# Patient Record
Sex: FEMALE | Race: WHITE | NOT HISPANIC OR LATINO | Employment: OTHER
[De-identification: names, ages, dates, MRNs, and addresses within clinical notes are randomized per-mention and may not be internally consistent; named-entity substitution may affect disease eponyms.]

---

## 2017-07-22 ENCOUNTER — HEALTH MAINTENANCE LETTER (OUTPATIENT)
Age: 62
End: 2017-07-22

## 2017-08-01 ENCOUNTER — HOSPITAL ENCOUNTER (OUTPATIENT)
Facility: CLINIC | Age: 62
End: 2017-08-01
Attending: OPHTHALMOLOGY | Admitting: OPHTHALMOLOGY
Payer: COMMERCIAL

## 2017-08-31 ENCOUNTER — OFFICE VISIT (OUTPATIENT)
Dept: INTERNAL MEDICINE | Facility: CLINIC | Age: 62
End: 2017-08-31
Payer: COMMERCIAL

## 2017-08-31 VITALS
OXYGEN SATURATION: 100 % | HEART RATE: 79 BPM | BODY MASS INDEX: 34.47 KG/M2 | SYSTOLIC BLOOD PRESSURE: 126 MMHG | WEIGHT: 190 LBS | DIASTOLIC BLOOD PRESSURE: 80 MMHG | TEMPERATURE: 98.3 F

## 2017-08-31 DIAGNOSIS — H26.9 CATARACT OF BOTH EYES, UNSPECIFIED CATARACT TYPE: ICD-10-CM

## 2017-08-31 DIAGNOSIS — E66.9 NON MORBID OBESITY, UNSPECIFIED OBESITY TYPE: ICD-10-CM

## 2017-08-31 DIAGNOSIS — Z12.31 ENCOUNTER FOR SCREENING MAMMOGRAM FOR BREAST CANCER: ICD-10-CM

## 2017-08-31 DIAGNOSIS — Z01.818 PREOP GENERAL PHYSICAL EXAM: Primary | ICD-10-CM

## 2017-08-31 DIAGNOSIS — D12.6 ADENOMATOUS POLYP OF COLON, UNSPECIFIED PART OF COLON: ICD-10-CM

## 2017-08-31 PROCEDURE — 99204 OFFICE O/P NEW MOD 45 MIN: CPT | Performed by: INTERNAL MEDICINE

## 2017-08-31 NOTE — MR AVS SNAPSHOT
After Visit Summary   8/31/2017    Nayeli Byrd    MRN: [id number]           Patient Information     Date Of Birth          1955        Visit Information        Provider Department      8/31/2017 10:00 AM Wing Olivia MD [facility]        Today's Diagnoses     Preop general physical exam    -  1      Care Instructions    No solid food after  Midnight prior to surgery. May have clear liquids up to 5 hours prior to surgery  Appt with myself or GYN dept for pap/pelvic exam  Call  [phone] or use [employer] to schedule a future lab appointment  fasting in the next 1 month  Calorie/carbohydrate (sugar, bread, potato, pasta, etc) reduction in diet for weight loss.  Increase color on your plate with fruits and vegetables. Increase  frequency of walking or other aerobic exercise as able (goal is daily)  [facility]  with  [facility]. They will call to schedule. If not heard from them in 1 week, then call [phone].           Follow-ups after your visit        Your next 10 appointments already scheduled     Sep 07, 2017   Procedure with Clemente Sykes MD   [facility] (--)    [address]  [facility] [address]   [phone]            Sep 07, 2017   Procedure with Clemente Sykes MD   [facility] (--)    [address]  [facility] [address]   [phone]            Sep 21, 2017   Procedure with Clemente Sykes MD   [facility] (--)    [address]  [facility] [address]   [phone]            Sep 21, 2017   Procedure with Clemente Sykes MD   [facility] (--)    [address]  [facility] [address]   [phone]            Oct 03, 2017 10:00 AM CDT   MA SCREENING DIGITAL BILATERAL with OXMA1   [facility] ([facility]  "[facility])    [address] [address] [phone]           Do not use any powder, lotion or deodorant under your arms or on your breast. If you do, we will ask you to remove it before your exam.  Wear comfortable, two-piece clothing.  If you have any allergies, tell your care team.  Bring any previous mammograms from other facilities or have them mailed to the breast center. Three-dimensional (3D) mammograms are available at [place] locations in [facility], [facility], and [place]. [facility] locations include [facility] in [place].   Benefits of 3D mammograms include: - Improved rate of cancer detection - Decreases your chance of having to go back for more tests, which means fewer: - \"False-positive\" results (This means that there is an abnormal area but it isn't cancer.) - Invasive testing procedures, such as a biopsy or surgery - Can provide clearer images of the breast if you have dense breast tissue. 3D mammography is an optional exam that anyone can have with a 2D mammogram. It doesn't replace or take the place of a 2D mammogram. 2D mammograms remain an effective screening test for all women.  Not all insurance companies cover the cost of a 3D mammogram. Check with your insurance.              Who to contact     If you have questions or need follow up information about today's clinic visit or your schedule please contact [facility] directly at [phone].  Normal or non-critical lab and imaging results will be communicated to you by MyChart, letter or phone within 4 business days after the clinic has received the results. If you do not hear from us within 7 days, please contact the clinic through [employer] or phone. If you have a critical or abnormal lab result, we will notify you by phone as soon as possible.  Submit refill requests through [employer] or call your pharmacy and they will forward the " refill request to us. Please allow 3 business days for your refill to be completed.          Additional Information About Your Visit        MyChart Information     [employer] gives you secure access to your electronic health record. If you see a primary care provider, you can also send messages to your care team and make appointments. If you have questions, please call your primary care clinic.  If you do not have a primary care provider, please call [phone] and they will assist you.        Care EveryWhere ID     This is your Care EveryWhere ID. This could be used by other organizations to access your [place] medical records  [id number]        Your Vitals Were     Pulse Temperature Last Period Pulse Oximetry BMI (Body Mass Index)       79 98.3  F (36.8  C) (Oral) 02/25/2004 100% 34.47 kg/m2        Blood Pressure from Last 3 Encounters:   08/31/17 126/80   01/19/15 116/80   03/01/13 134/80    Weight from Last 3 Encounters:   08/31/17 190 lb (86.2 kg)   01/19/15 158 lb 12.8 oz (72 kg)   03/01/13 161 lb (73 kg)              Today, you had the following     No orders found for display       Primary Care Provider Office Phone # Fax #    Wing Olivia [phone]       [address] [facility] [address]        Equal Access to Services     CHASTITY CROSS : Hadii aad ku hadasho Soomaali, waaxda luqadaha, qaybta kaalmada adeegyada, waxay idiin haytatyana cardoza . So [facility] [phone].    ATENCIÓN: Si habla español, tiene a morrow disposición servicios gratuitos de asistencia lingüística. Llame al [phone].    We comply with applicable federal civil rights laws and [place] laws. We do not discriminate on the basis of race, color, national origin, age, disability sex, sexual orientation or gender identity.            Thank you!     Thank you for choosing [facility]  for your care. Our goal is always to provide you with excellent care. Hearing back from our patients is  one way we can continue to improve our services. Please take a few minutes to complete the written survey that you may receive in the mail after your visit with us. Thank you!             Your Updated Medication List - Protect others around you: Learn how to safely use, store and throw away your medicines at www.disposemymeds.org.      Notice  As of 8/31/2017 10:36 AM    You have not been prescribed any medications.

## 2017-08-31 NOTE — NURSING NOTE
"Chief Complaint   Patient presents with     Pre-Op Exam       Initial /80  Pulse 79  Temp 98.3  F (36.8  C) (Oral)  Wt 190 lb (86.2 kg)  LMP 02/25/2004  SpO2 100%  BMI 34.47 kg/m2 Estimated body mass index is 34.47 kg/(m^2) as calculated from the following:    Height as of 1/19/15: 5' 2.25\" (1.581 m).    Weight as of this encounter: 190 lb (86.2 kg).  Medication Reconciliation: complete  "

## 2017-08-31 NOTE — PATIENT INSTRUCTIONS
No solid food after  Midnight prior to surgery. May have clear liquids up to 5 hours prior to surgery  Appt with myself or GYN dept for pap/pelvic exam  Call  [phone] or use [employer] to schedule a future lab appointment  fasting in the next 1 month  Calorie/carbohydrate (sugar, bread, potato, pasta, etc) reduction in diet for weight loss.  Increase color on your plate with fruits and vegetables. Increase  frequency of walking or other aerobic exercise as able (goal is daily)  Mammogram  [place]  Colonoscopy  with  [facility]. They will call to schedule. If not heard from them in 1 week, then call [phone].

## 2017-08-31 NOTE — PROGRESS NOTES
[address] [address]  [phone]  Dept: [phone]    PRE-OP EVALUATION:  Today's date: 2017    Nayeli Byrd (: 1955) presents for pre-operative evaluation assessment as requested by Dr. Sykes.  She requires evaluation and anesthesia risk assessment prior to undergoing surgery/procedure for treatment of Catatracts .  Proposed procedure: Removal of Catatracts    Date of Surgery/ Procedure: 2017 and 2017  Time of Surgery/ Procedure: 12:30pm  Hospital/Surgical Facility: [facility]  Primary Physician: Wing Olivia  Type of Anesthesia Anticipated: local    Patient has a Health Care Directive or Living Will:  YES     Preop Questions 2017   1.  Do you have a history of heart attack, stroke, stent, bypass or surgery on an artery in the head, neck, heart or legs? No   2.  Do you ever have any pain or discomfort in your chest? No   3.  Do you have a history of  Heart Failure? No   4.   Are you troubled by shortness of breath when:  walking on a level surface, or up a slight hill, or at night? No   5.  Do you currently have a cold, bronchitis or other respiratory infection? No   6.  Do you have a cough, shortness of breath, or wheezing? No   7.  Do you sometimes get pains in the calves of your legs when you walk? No   8. Do you or anyone in your family have previous history of blood clots? No   9.  Do you or does anyone in your family have a serious bleeding problem such as prolonged bleeding following surgeries or cuts? No   10. Have you ever had problems with anemia or been told to take iron pills? No   11. Have you had any abnormal blood loss such as black, tarry or bloody stools, or abnormal vaginal bleeding? No   12. Have you ever had a blood transfusion? No   13. Have you or any of your relatives ever had problems with anesthesia? No   14. Do you have sleep apnea, excessive snoring or daytime drowsiness? No   15.  Do you have any prosthetic heart valves? No   16. Do you have prosthetic joints? No   17. Is there any chance that you may be pregnant? No           HPI:                                                      Brief HPI related to upcoming procedure: bilateral cataracts with clouding of vision and trouble driving at night. Presents for clearance leilani undergo surgical correction. See below for other medical issues. Due for pap/pelvbic and colonoscopy with hx polyp 5 years ago. Due for mammogram screening.            MEDICAL HISTORY:                                                    Patient Active Problem List    Diagnosis Date Noted     Pain in shoulder 2013     Priority: Medium     Advanced directives, counseling/discussion 10/30/2012     Priority: Medium     Discussed advance care planning with patient; however, patient declined at this time. 10/30/2012          Colonic polyp      Priority: Medium     HYPERLIPIDEMIA LDL GOAL <130 10/31/2010     Priority: Medium      Past Medical History:   Diagnosis Date     Colonic polyp      Contact dermatitis and other eczema, due to unspecified cause      Depressive disorder, not elsewhere classified      Fracture, humerus closed 2013    Closed left humeral head fracture.     Hyperlipidemia LDL goal <130       Morbid obesity (H)      Unspecified essential hypertension      Past Surgical History:   Procedure Laterality Date     C/SECTION, LOW TRANSVERSE      , Low Transverse     EYE SURGERY       ORTHOPEDIC SURGERY      right foot surgery     No current outpatient prescriptions on file.     OTC products: none    Allergies   Allergen Reactions     Hctz      Renal insufficiency      Latex Allergy: NO    Social History   Substance Use Topics     Smoking status: Never Smoker     Smokeless tobacco: Never Used     Alcohol use Yes      Comment: 2 per day     History   Drug Use No       REVIEW OF SYSTEMS:                                                    C: NEGATIVE for  fever, chills. Weight up 32 pounds in 3 years after previous weight loss  I: NEGATIVE for worrisome rashes, moles or lesions  E: See above  E/M: NEGATIVE for ear, mouth and throat problems  R: NEGATIVE for significant cough or SOB  B: NEGATIVE for masses, tenderness or discharge  CV: NEGATIVE for chest pain, palpitations or peripheral edema  GI: NEGATIVE for nausea, abdominal pain, heartburn, or change in bowel habits  : NEGATIVE for frequency, dysuria, or hematuria  M: NEGATIVE for significant arthralgias or myalgia  N: NEGATIVE for weakness, dizziness or paresthesias  E: NEGATIVE for temperature intolerance, skin/hair changes. POSITIVE for obesity  H: NEGATIVE for bleeding problems  P: NEGATIVE for changes in mood or affect    EXAM:                                                    /80  Pulse 79  Temp 98.3  F (36.8  C) (Oral)  Wt 190 lb (86.2 kg)  LMP 02/25/2004  SpO2 100%  BMI 34.47 kg/m2  Eye: PERRL, EOMI. Bilateral cataracts  HENT: ear canals and TM's normal and nose and mouth without ulcers or lesions   Neck: no adenopathy. Thyroid normal to palpation. No bruits  Pulm: Lungs clear to auscultation   CV: Regular rates and rhythm  GI: Soft, obese,  nontender, Normal active bowel sounds, No hepatosplenomegaly or masses palpable  Ext: Peripheral pulses intact. No edema.  Neuro: Normal strength and tone, sensory exam grossly normal      DIAGNOSTICS:                                                    No labs or EKG required for low risk surgery (cataract)    Recent Labs   Lab Test  10/30/12   0935   HGB  15.5   PLT  190   NA  143   POTASSIUM  4.2   CR  0.63        IMPRESSION:                                                    Reason for surgery/procedure:  Bilateral cataracts  Diagnosis/reason for consult:  Obesity, history of colon polyp     The proposed surgical procedure is considered LOW risk.    REVISED CARDIAC RISK INDEX  The patient has the following serious cardiovascular risks for  perioperative complications such as (MI, PE, VFib and 3  AV Block):  No serious cardiac risks  INTERPRETATION: 0 risks: Class I (very low risk - 0.4% complication rate)    The patient has the following additional risks for perioperative complications:  No identified additional risks         RECOMMENDATIONS:                                                      APPROVAL GIVEN to proceed with proposed procedure, without further diagnostic evaluation     PLAN:  No solid food after  midnight prior to surgery. May have clear liquids up to 5 hours prior to surgery  Appt with myself or GYN dept for pap/pelvic exam  Call  [phone] or use [employer] to schedule a future lab appointment  fasting in the next 1 month  Calorie/carbohydrate (sugar, bread, potato, pasta, etc) reduction in diet for weight loss.  Increase color on your plate with fruits and vegetables. Increase  frequency of walking or other aerobic exercise as able (goal is daily)  Mammogram  [place]  Colonoscopy  with  [facility]. They will call to schedule. If not heard from them in 1 week, then call [phone].            Signed Electronically by: Wing Olivia MD    Copy of this evaluation report is provided to requesting physician.    [facility] Preop Guidelines

## 2017-09-02 ENCOUNTER — HEALTH MAINTENANCE LETTER (OUTPATIENT)
Age: 62
End: 2017-09-02

## 2017-09-06 NOTE — H&P (VIEW-ONLY)
[address] [address]  [phone]  Dept: [phone]    PRE-OP EVALUATION:  Today's date: 2017    Nayeli Byrd (: 1955) presents for pre-operative evaluation assessment as requested by Dr. Sykes.  She requires evaluation and anesthesia risk assessment prior to undergoing surgery/procedure for treatment of Catatracts .  Proposed procedure: Removal of Catatracts    Date of Surgery/ Procedure: 2017 and 2017  Time of Surgery/ Procedure: 12:30pm  Hospital/Surgical Facility: [facility]  Primary Physician: Wing Olivia  Type of Anesthesia Anticipated: local    Patient has a Health Care Directive or Living Will:  YES     Preop Questions 2017   1.  Do you have a history of heart attack, stroke, stent, bypass or surgery on an artery in the head, neck, heart or legs? No   2.  Do you ever have any pain or discomfort in your chest? No   3.  Do you have a history of  Heart Failure? No   4.   Are you troubled by shortness of breath when:  walking on a level surface, or up a slight hill, or at night? No   5.  Do you currently have a cold, bronchitis or other respiratory infection? No   6.  Do you have a cough, shortness of breath, or wheezing? No   7.  Do you sometimes get pains in the calves of your legs when you walk? No   8. Do you or anyone in your family have previous history of blood clots? No   9.  Do you or does anyone in your family have a serious bleeding problem such as prolonged bleeding following surgeries or cuts? No   10. Have you ever had problems with anemia or been told to take iron pills? No   11. Have you had any abnormal blood loss such as black, tarry or bloody stools, or abnormal vaginal bleeding? No   12. Have you ever had a blood transfusion? No   13. Have you or any of your relatives ever had problems with anesthesia? No   14. Do you have sleep apnea, excessive snoring or daytime drowsiness? No   15.  Do you have any prosthetic heart valves? No   16. Do you have prosthetic joints? No   17. Is there any chance that you may be pregnant? No           HPI:                                                      Brief HPI related to upcoming procedure: bilateral cataracts with clouding of vision and trouble driving at night. Presents for clearance leilani undergo surgical correction. See below for other medical issues. Due for pap/pelvbic and colonoscopy with hx polyp 5 years ago. Due for mammogram screening.            MEDICAL HISTORY:                                                    Patient Active Problem List    Diagnosis Date Noted     Pain in shoulder 2013     Priority: Medium     Advanced directives, counseling/discussion 10/30/2012     Priority: Medium     Discussed advance care planning with patient; however, patient declined at this time. 10/30/2012          Colonic polyp      Priority: Medium     HYPERLIPIDEMIA LDL GOAL <130 10/31/2010     Priority: Medium      Past Medical History:   Diagnosis Date     Colonic polyp      Contact dermatitis and other eczema, due to unspecified cause      Depressive disorder, not elsewhere classified      Fracture, humerus closed 2013    Closed left humeral head fracture.     Hyperlipidemia LDL goal <130       Morbid obesity (H)      Unspecified essential hypertension      Past Surgical History:   Procedure Laterality Date     C/SECTION, LOW TRANSVERSE      , Low Transverse     EYE SURGERY       ORTHOPEDIC SURGERY      right foot surgery     No current outpatient prescriptions on file.     OTC products: none    Allergies   Allergen Reactions     Hctz      Renal insufficiency      Latex Allergy: NO    Social History   Substance Use Topics     Smoking status: Never Smoker     Smokeless tobacco: Never Used     Alcohol use Yes      Comment: 2 per day     History   Drug Use No       REVIEW OF SYSTEMS:                                                    C: NEGATIVE for  fever, chills. Weight up 32 pounds in 3 years after previous weight loss  I: NEGATIVE for worrisome rashes, moles or lesions  E: See above  E/M: NEGATIVE for ear, mouth and throat problems  R: NEGATIVE for significant cough or SOB  B: NEGATIVE for masses, tenderness or discharge  CV: NEGATIVE for chest pain, palpitations or peripheral edema  GI: NEGATIVE for nausea, abdominal pain, heartburn, or change in bowel habits  : NEGATIVE for frequency, dysuria, or hematuria  M: NEGATIVE for significant arthralgias or myalgia  N: NEGATIVE for weakness, dizziness or paresthesias  E: NEGATIVE for temperature intolerance, skin/hair changes. POSITIVE for obesity  H: NEGATIVE for bleeding problems  P: NEGATIVE for changes in mood or affect    EXAM:                                                    /80  Pulse 79  Temp 98.3  F (36.8  C) (Oral)  Wt 190 lb (86.2 kg)  LMP 02/25/2004  SpO2 100%  BMI 34.47 kg/m2  Eye: PERRL, EOMI. Bilateral cataracts  HENT: ear canals and TM's normal and nose and mouth without ulcers or lesions   Neck: no adenopathy. Thyroid normal to palpation. No bruits  Pulm: Lungs clear to auscultation   CV: Regular rates and rhythm  GI: Soft, obese,  nontender, Normal active bowel sounds, No hepatosplenomegaly or masses palpable  Ext: Peripheral pulses intact. No edema.  Neuro: Normal strength and tone, sensory exam grossly normal      DIAGNOSTICS:                                                    No labs or EKG required for low risk surgery (cataract)    Recent Labs   Lab Test  10/30/12   0935   HGB  15.5   PLT  190   NA  143   POTASSIUM  4.2   CR  0.63        IMPRESSION:                                                    Reason for surgery/procedure:  Bilateral cataracts  Diagnosis/reason for consult:  Obesity, history of colon polyp     The proposed surgical procedure is considered LOW risk.    REVISED CARDIAC RISK INDEX  The patient has the following serious cardiovascular risks for  perioperative complications such as (MI, PE, VFib and 3  AV Block):  No serious cardiac risks  INTERPRETATION: 0 risks: Class I (very low risk - 0.4% complication rate)    The patient has the following additional risks for perioperative complications:  No identified additional risks         RECOMMENDATIONS:                                                      APPROVAL GIVEN to proceed with proposed procedure, without further diagnostic evaluation     PLAN:  No solid food after  midnight prior to surgery. May have clear liquids up to 5 hours prior to surgery  Appt with myself or GYN dept for pap/pelvic exam  Call  [phone] or use [employer] to schedule a future lab appointment  fasting in the next 1 month  Calorie/carbohydrate (sugar, bread, potato, pasta, etc) reduction in diet for weight loss.  Increase color on your plate with fruits and vegetables. Increase  frequency of walking or other aerobic exercise as able (goal is daily)  Mammogram  [place]  Colonoscopy  with  [facility]. They will call to schedule. If not heard from them in 1 week, then call [phone].            Signed Electronically by: Wing Olivia MD    Copy of this evaluation report is provided to requesting physician.    [facility] Preop Guidelines

## 2017-09-07 ENCOUNTER — HOSPITAL ENCOUNTER (OUTPATIENT)
Facility: CLINIC | Age: 62
Discharge: HOME | End: 2017-09-07
Attending: OPHTHALMOLOGY | Admitting: OPHTHALMOLOGY
Payer: COMMERCIAL

## 2017-09-07 ENCOUNTER — ANESTHESIA (OUTPATIENT)
Dept: SURGERY | Facility: CLINIC | Age: 62
End: 2017-09-07
Payer: COMMERCIAL

## 2017-09-07 ENCOUNTER — SURGERY (OUTPATIENT)
Age: 62
End: 2017-09-07

## 2017-09-07 ENCOUNTER — ANESTHESIA EVENT (OUTPATIENT)
Dept: SURGERY | Facility: CLINIC | Age: 62
End: 2017-09-07
Payer: COMMERCIAL

## 2017-09-07 VITALS
WEIGHT: 170 LBS | BODY MASS INDEX: 31.28 KG/M2 | HEIGHT: 62 IN | DIASTOLIC BLOOD PRESSURE: 103 MMHG | OXYGEN SATURATION: 94 % | RESPIRATION RATE: 16 BRPM | SYSTOLIC BLOOD PRESSURE: 146 MMHG

## 2017-09-07 PROCEDURE — 40000170 ZZH STATISTIC PRE-PROCEDURE ASSESSMENT II: Performed by: OPHTHALMOLOGY

## 2017-09-07 PROCEDURE — V2632 POST CHMBR INTRAOCULAR LENS: HCPCS | Performed by: OPHTHALMOLOGY

## 2017-09-07 PROCEDURE — 25000128 H RX IP 250 OP 636: Performed by: OPHTHALMOLOGY

## 2017-09-07 PROCEDURE — 25000125 ZZHC RX 250: Performed by: ANESTHESIOLOGY

## 2017-09-07 PROCEDURE — V2788 PRESBYOPIA-CORRECT FUNCTION: HCPCS | Performed by: OPHTHALMOLOGY

## 2017-09-07 PROCEDURE — 71000028 ZZH EYE RECOVERY PHASE 2 EACH 15 MINS: Performed by: OPHTHALMOLOGY

## 2017-09-07 PROCEDURE — 27210794 ZZH OR GENERAL SUPPLY STERILE: Performed by: OPHTHALMOLOGY

## 2017-09-07 PROCEDURE — 36000135 ZZH KERATOTOMY ARCUATE W FEMTOSECOND LASER/IMAGING FOR ATIOL: Performed by: OPHTHALMOLOGY

## 2017-09-07 PROCEDURE — 37000008 ZZH ANESTHESIA TECHNICAL FEE, 1ST 30 MIN: Performed by: OPHTHALMOLOGY

## 2017-09-07 PROCEDURE — 25000125 ZZHC RX 250

## 2017-09-07 PROCEDURE — 25000128 H RX IP 250 OP 636: Performed by: ANESTHESIOLOGY

## 2017-09-07 PROCEDURE — 36000101 ZZH EYE SURGERY LEVEL 3 1ST 30 MIN: Performed by: OPHTHALMOLOGY

## 2017-09-07 PROCEDURE — 25000128 H RX IP 250 OP 636: Performed by: NURSE ANESTHETIST, CERTIFIED REGISTERED

## 2017-09-07 PROCEDURE — 25000125 ZZHC RX 250: Performed by: OPHTHALMOLOGY

## 2017-09-07 DEVICE — IMPLANTABLE DEVICE: Type: IMPLANTABLE DEVICE | Site: EYE | Status: FUNCTIONAL

## 2017-09-07 RX ORDER — ONDANSETRON 2 MG/ML
INJECTION INTRAMUSCULAR; INTRAVENOUS PRN
Status: DISCONTINUED | OUTPATIENT
Start: 2017-09-07 | End: 2017-09-07

## 2017-09-07 RX ORDER — PROPARACAINE HYDROCHLORIDE 5 MG/ML
SOLUTION/ DROPS OPHTHALMIC PRN
Status: DISCONTINUED | OUTPATIENT
Start: 2017-09-07 | End: 2017-09-07 | Stop reason: HOSPADM

## 2017-09-07 RX ORDER — MOXIFLOXACIN 5 MG/ML
1 SOLUTION/ DROPS OPHTHALMIC
Status: COMPLETED | OUTPATIENT
Start: 2017-09-07 | End: 2017-09-07

## 2017-09-07 RX ORDER — SODIUM CHLORIDE, SODIUM LACTATE, POTASSIUM CHLORIDE, CALCIUM CHLORIDE 600; 310; 30; 20 MG/100ML; MG/100ML; MG/100ML; MG/100ML
500 INJECTION, SOLUTION INTRAVENOUS CONTINUOUS
Status: DISCONTINUED | OUTPATIENT
Start: 2017-09-07 | End: 2017-09-07 | Stop reason: HOSPADM

## 2017-09-07 RX ORDER — BALANCED SALT SOLUTION 6.4; .75; .48; .3; 3.9; 1.7 MG/ML; MG/ML; MG/ML; MG/ML; MG/ML; MG/ML
SOLUTION OPHTHALMIC PRN
Status: DISCONTINUED | OUTPATIENT
Start: 2017-09-07 | End: 2017-09-07 | Stop reason: HOSPADM

## 2017-09-07 RX ORDER — FENTANYL CITRATE 50 UG/ML
INJECTION, SOLUTION INTRAMUSCULAR; INTRAVENOUS PRN
Status: DISCONTINUED | OUTPATIENT
Start: 2017-09-07 | End: 2017-09-07

## 2017-09-07 RX ORDER — TETRACAINE HYDROCHLORIDE 5 MG/ML
SOLUTION OPHTHALMIC PRN
Status: DISCONTINUED | OUTPATIENT
Start: 2017-09-07 | End: 2017-09-07 | Stop reason: HOSPADM

## 2017-09-07 RX ORDER — DICLOFENAC SODIUM 1 MG/ML
1 SOLUTION/ DROPS OPHTHALMIC
Status: COMPLETED | OUTPATIENT
Start: 2017-09-07 | End: 2017-09-07

## 2017-09-07 RX ORDER — PROPARACAINE HYDROCHLORIDE 5 MG/ML
1 SOLUTION/ DROPS OPHTHALMIC ONCE
Status: COMPLETED | OUTPATIENT
Start: 2017-09-07 | End: 2017-09-07

## 2017-09-07 RX ORDER — LIDOCAINE HYDROCHLORIDE 10 MG/ML
INJECTION, SOLUTION EPIDURAL; INFILTRATION; INTRACAUDAL; PERINEURAL PRN
Status: DISCONTINUED | OUTPATIENT
Start: 2017-09-07 | End: 2017-09-07 | Stop reason: HOSPADM

## 2017-09-07 RX ORDER — TROPICAMIDE 10 MG/ML
1 SOLUTION/ DROPS OPHTHALMIC
Status: COMPLETED | OUTPATIENT
Start: 2017-09-07 | End: 2017-09-07

## 2017-09-07 RX ORDER — PHENYLEPHRINE HYDROCHLORIDE 25 MG/ML
1 SOLUTION/ DROPS OPHTHALMIC
Status: COMPLETED | OUTPATIENT
Start: 2017-09-07 | End: 2017-09-07

## 2017-09-07 RX ADMIN — TROPICAMIDE 1 DROP: 10 SOLUTION/ DROPS OPHTHALMIC at 11:23

## 2017-09-07 RX ADMIN — LIDOCAINE HYDROCHLORIDE 1 ML: 10 INJECTION, SOLUTION EPIDURAL; INFILTRATION; INTRACAUDAL; PERINEURAL at 12:30

## 2017-09-07 RX ADMIN — Medication 0.8 ML: at 12:30

## 2017-09-07 RX ADMIN — LIDOCAINE HYDROCHLORIDE 1 ML: 10 INJECTION, SOLUTION EPIDURAL; INFILTRATION; INTRACAUDAL; PERINEURAL at 11:38

## 2017-09-07 RX ADMIN — DICLOFENAC SODIUM 1 DROP: 1 SOLUTION OPHTHALMIC at 11:28

## 2017-09-07 RX ADMIN — PHENYLEPHRINE HYDROCHLORIDE 1 DROP: 2.5 SOLUTION/ DROPS OPHTHALMIC at 11:17

## 2017-09-07 RX ADMIN — PHENYLEPHRINE HYDROCHLORIDE 1 DROP: 2.5 SOLUTION/ DROPS OPHTHALMIC at 11:28

## 2017-09-07 RX ADMIN — FENTANYL CITRATE 25 MCG: 50 INJECTION, SOLUTION INTRAMUSCULAR; INTRAVENOUS at 12:34

## 2017-09-07 RX ADMIN — TETRACAINE HYDROCHLORIDE 2 DROP: 5 SOLUTION OPHTHALMIC at 12:31

## 2017-09-07 RX ADMIN — EPINEPHRINE 0.5 ML: 1 INJECTION, SOLUTION, CONCENTRATE INTRAVENOUS at 12:30

## 2017-09-07 RX ADMIN — DICLOFENAC SODIUM 1 DROP: 1 SOLUTION OPHTHALMIC at 11:17

## 2017-09-07 RX ADMIN — FENTANYL CITRATE 50 MCG: 50 INJECTION, SOLUTION INTRAMUSCULAR; INTRAVENOUS at 12:22

## 2017-09-07 RX ADMIN — MOXIFLOXACIN 1 DROP: 5 SOLUTION/ DROPS OPHTHALMIC at 11:17

## 2017-09-07 RX ADMIN — BALANCED SALT SOLUTION 15 ML: 6.4; .75; .48; .3; 3.9; 1.7 SOLUTION OPHTHALMIC at 12:38

## 2017-09-07 RX ADMIN — MOXIFLOXACIN 1 DROP: 5 SOLUTION/ DROPS OPHTHALMIC at 11:28

## 2017-09-07 RX ADMIN — MIDAZOLAM HYDROCHLORIDE 2 MG: 1 INJECTION, SOLUTION INTRAMUSCULAR; INTRAVENOUS at 12:22

## 2017-09-07 RX ADMIN — PROPARACAINE HYDROCHLORIDE 2 DROP: 5 SOLUTION/ DROPS OPHTHALMIC at 12:39

## 2017-09-07 RX ADMIN — PHENYLEPHRINE HYDROCHLORIDE 1 DROP: 2.5 SOLUTION/ DROPS OPHTHALMIC at 11:23

## 2017-09-07 RX ADMIN — ONDANSETRON 4 MG: 2 INJECTION INTRAMUSCULAR; INTRAVENOUS at 12:27

## 2017-09-07 RX ADMIN — FENTANYL CITRATE 25 MCG: 50 INJECTION, SOLUTION INTRAMUSCULAR; INTRAVENOUS at 12:30

## 2017-09-07 RX ADMIN — PROPARACAINE HYDROCHLORIDE 1 DROP: 5 SOLUTION/ DROPS OPHTHALMIC at 11:17

## 2017-09-07 RX ADMIN — TROPICAMIDE 1 DROP: 10 SOLUTION/ DROPS OPHTHALMIC at 11:28

## 2017-09-07 RX ADMIN — MOXIFLOXACIN 1 DROP: 5 SOLUTION/ DROPS OPHTHALMIC at 11:23

## 2017-09-07 RX ADMIN — SODIUM CHLORIDE, SODIUM LACTATE, POTASSIUM CHLORIDE, CALCIUM CHLORIDE: 600; 310; 30; 20 INJECTION, SOLUTION INTRAVENOUS at 12:14

## 2017-09-07 RX ADMIN — BALANCED SALT SOLUTION 15 ML: 6.4; .75; .48; .3; 3.9; 1.7 SOLUTION OPHTHALMIC at 12:30

## 2017-09-07 RX ADMIN — EPINEPHRINE 500 ML: 1 INJECTION, SOLUTION, CONCENTRATE INTRAVENOUS at 12:30

## 2017-09-07 RX ADMIN — DICLOFENAC SODIUM 1 DROP: 1 SOLUTION OPHTHALMIC at 11:23

## 2017-09-07 RX ADMIN — TROPICAMIDE 1 DROP: 10 SOLUTION/ DROPS OPHTHALMIC at 11:17

## 2017-09-07 ASSESSMENT — ENCOUNTER SYMPTOMS: SEIZURES: 0

## 2017-09-07 ASSESSMENT — LIFESTYLE VARIABLES: TOBACCO_USE: 0

## 2017-09-07 NOTE — IP AVS SNAPSHOT
MRN:[id number]                      After Visit Summary   9/7/2017    Nayeli Byrd    MRN: [id number]           Thank you!     Thank you for choosing [facility] for your care. Our goal is always to provide you with excellent care. Hearing back from our patients is one way we can continue to improve our services. Please take a few minutes to complete the written survey that you may receive in the mail after you visit with us. Thank you!        Patient Information     Date Of Birth          1955        About your hospital stay     You were admitted on:  September 7, 2017 You last received care in the:  [facility]    You were discharged on:  September 7, 2017       Who to Call     For medical emergencies, please call 911.  For non-urgent questions about your medical care, please call your primary care provider or clinic, [phone]  For questions related to your surgery, please call your surgery clinic        Attending Provider     Provider Specialty    Clemente Sykes MD Ophthalmology       Primary Care Provider Office Phone # Fax #    Wing Olivia [phone]      Your next 10 appointments already scheduled     Sep 07, 2017   Procedure with Clemente Sykes MD   [facility] (--)    [address]  [facility] [address]   [phone]            Sep 21, 2017   Procedure with Clemente Sykes MD   [facility] (--)    [address]  [facility] [address]   [phone]            Sep 21, 2017   Procedure with Clemente Sykes MD   [facility] (--)    [address]  [facility] [address]   [phone]            Oct 03, 2017 10:00 AM CDT   MA SCREENING DIGITAL BILATERAL with OXMA1   [facility] ([facility])    [address] [address] [phone]     Patient's pharmacy rep, Yanely, calling regarding: paperwork for Medicare that is required for patient's refill.    Please call Yanely at [phone] to advise.    "       Do not use any powder, lotion or deodorant under your arms or on your breast. If you do, we will ask you to remove it before your exam.  Wear comfortable, two-piece clothing.  If you have any allergies, tell your care team.  Bring any previous mammograms from other facilities or have them mailed to the breast center. Three-dimensional (3D) mammograms are available at [place] locations in [facility], and [place]. [facility] locations include [place] and [facility] in [place].   Benefits of 3D mammograms include: - Improved rate of cancer detection - Decreases your chance of having to go back for more tests, which means fewer: - \"False-positive\" results (This means that there is an abnormal area but it isn't cancer.) - Invasive testing procedures, such as a biopsy or surgery - Can provide clearer images of the breast if you have dense breast tissue. 3D mammography is an optional exam that anyone can have with a 2D mammogram. It doesn't replace or take the place of a 2D mammogram. 2D mammograms remain an effective screening test for all women.  Not all insurance companies cover the cost of a 3D mammogram. Check with your insurance.              Further instructions from your care team       [facility] Anesthesia [facility] Discharge  Instructions  Anesthesia (Eye Care Center)   Adult Discharge Instructions    For 24 hours after surgery    1. Get plenty of rest.  Make arrangements to have a responsible adult stay with you for at least 6 hours after you leave the hospital.  2. Do not drive or use heavy equipment for 24 hours.    3. Do not drink alcohol for 24 hours.  4. Do not sign legal documents or make important decisions for 24 hours.  5. Avoid strenuous or risky activities. You may feel lightheaded.  If so, sit for a few minutes before standing.  Have someone help you get up.   6. Conscious sedation patients may resume a regular " "diet..  7. Any questions of medical nature, call your physician.    Cataract Surgery Postoperative Instructions  Dr. Clemente Sykes      Postoperative Medications: After surgery, you will use eye drop medications. In most cases, you will start these eye drops 2 days before the day of surgery.   Follow the directions provided to you from the pharmacy or from the doctor's office.    The drops might sting a little when they are instilled, and that is normal.    It doesn't matter what order you put the drops into your eyes, but you should wait at least one minute between drops.    Recently we started using a compounded drop that contains all three prescriptions in one bottle. If you have this drop you only need to use one drop 4 time per day. Many people choose to do the drops at breakfast, lunch, dinner, and bedtime.    Artificial Tears- Are lubricating drops used to moisturize the eye.  You can use these as much as you want.  Particularly if your eyes feel watery, gritty, or uncomfortable.  Chilling these drops in the refrigerator results in a more soothing feeling.  There are several brands of artificial tears available including, but not limited to, Optive, Refresh, Systane, Blink, Genteal, Soothe, and others.  You should not use drops that are \"gets the red out.\"  You do not need a prescription for these medications.     Please continue any glaucoma, dry eye, or other medications you were using prior to the surgery.      Please allow 24 to 48 hours when requesting refills, and call BEFORE you run out of drops.    Restriction on Activities-  It is extremely important that you DO NOT RUB THE TREATED EYE.    - You will be given a clear plastic shield to wear as protection over your eye the night after surgery.    - Refrain from many activities that may put your eye at risk of injury, as well as areas containing a high volume of chemicals, dust, and debris.    - Do Not wear any eye makeup or moisturizer around the " "eye for 1 week after surgery.    - Do Not swim or go into a hot-tub, jacuzzi, or sauna for 1 week following surgery.  You can take showers as normal, but avoid getting shampoo or soap into your eyes.     - Avoid strenuous activity, including lifting more than 10 pounds, for 1 week after surgery.       - It is fine to bathe, read, watch TV, and use the computer.    Symptoms requiring medical attention:     - Sudden onset of increased discharge from the eye.  - Persistent or increasing pain in the eye.  - Sudden decrease in vision.  - Persistent nausea or vomiting.      If you have any questions or concerns before or after your surgery, please contact Dr. Sykes's office at [phone].         Revised 3/27/2017    Pending Results     No orders found from 9/5/2017 to 9/8/2017.            Admission Information     Date & Time Provider Department Dept. Phone    9/7/2017 Clemente Sykes MD [facility] [phone]      Your Vitals Were     Blood Pressure Respirations Height Weight Last Period Pulse Oximetry    151/101 16 1.575 m (5' 2\") 77.1 kg (170 lb) 02/25/2004 94%    BMI (Body Mass Index)                   31.09 kg/m2           MyChart Information     [facility] gives you secure access to your electronic health record. If you see a primary care provider, you can also send messages to your care team and make appointments. If you have questions, please call your primary care clinic.  If you do not have a primary care provider, please call [phone] and they will assist you.        Care EveryWhere ID     This is your Care EveryWhere ID. This could be used by other organizations to access your [facility] medical records  [id number]        Equal Access to Services     CEDRIC CROSS : Lorenza Rizzo, wilma simental, shanda chavez. So [facility] [phone].    ATENCIÓN: Si habla español, tiene a morrow disposición servicios " marcy de asistencia lingüística. Carlos Enrique cummins [phone].    We comply with applicable federal civil rights laws and [place] laws. We do not discriminate on the basis of race, color, national origin, age, disability sex, sexual orientation or gender identity.               Review of your medicines      Notice     You have not been prescribed any medications.             Protect others around you: Learn how to safely use, store and throw away your medicines at www.disposemymeds.org.             Medication List: This is a list of all your medications and when to take them. Check marks below indicate your daily home schedule. Keep this list as a reference.      Notice     You have not been prescribed any medications.

## 2017-09-07 NOTE — OP NOTE
PATIENT NAME:  Nayeli Byrd    :  1955    PATIENT NUMBER:  [id number]    DATE OF SURGERY:  2017    SURGEON:  Clemente Sykes M.D.    PREOPERATIVE DIAGNOSIS:   1. Cataract right eye  2. Astigmatism right eye    POSTOPERATIVE DIAGNOSIS:  Same    PROCEDURE:   1. Phacoemulusification of cataract with intraocular lens implant right eye.  2. Femtosecond LASER incisions (astigmatic correction) for cataract surgery right eye and nuclear softening.    DETAILS: The patient was brought to the LASER suite. In a supine position the head was secured and the docking apparatus was applied to the eye. When good suction was achieved BSS was added to the well. The chair was then positioned underneath the LASER and docking was successfully achieved. OCT scanning was initiated and approved. The LASER was then used to deliver the desired incisions. See scanned paper note for LASER parameters.     The patient was then moved to the operative suite in stable condition where the eye was prepped and draped in the usual sterile fashion.  A lid speculum was applied. A super sharp blade was used to create a paracentesis, through which 1% preservative free Lidocaine was injected.  Visoelastic was then used to inflate the anterior chamber.  A biplanar incision at the temporal limbus was created with a 2.4 mm keratome or if the main wound was made with the Femtosecond LASER it was bluntly dissected.  A continuous curvilinear capsulorrhexis was started with a cystitome and completed using Utrata forceps.  The lens was hydrodissected and hydro delineated using BSS on a cannula.  The lens nucleus was removed using phacoemulsification.  Remaining cortex was removed using irrigation and aspiration.  Viscoelastic was injected to inflate the capsular bag and a 10.0 D ZXR00 (Symfony) IOL (presbyopia correcting) was inserted into the capsular bag without difficulty.  The lens was easily rotated using a Sinskey hook and good  centration was noted.  Residual viscoelastic and provisc material was removed with irrigation and aspiration.  BSS was used to hydrate the corneal incision and paracentesis sites which were checked and noted to be watertight.  Tobradex ointment was applied to the eye and a clear plastic shield was placed.  The patient tolerated the procedure well and left the operative suite in stable condition.    Clemente Sykes MD

## 2017-09-07 NOTE — ANESTHESIA CARE TRANSFER NOTE
Patient: Nayeli Byrd    Procedure(s):  RIGHT EYE PHACOEMULSIFICATION CLEAR CORNEA WITH DELUXE INTRAOCULAR LENS IMPLANT  - Wound Class: I-Clean    Diagnosis: CATARACT   Diagnosis Additional Information: No value filed.    Anesthesia Type:   MAC     Note:  Airway :Room Air  Patient transferred to:Phase II  Comments: Transferred to Eye Center recovery room in recliner with armrests up, spontaneous respirations, O2 saturation maintained greater than 95% with oxygen via room air. All monitors and alarms on and functioning, clinically stable vital signs. Report given to recovery RN and questions answered. Patient alert and following verbal directions.      Vitals: (Last set prior to Anesthesia Care Transfer)    CRNA VITALS  9/7/2017 1205 - 9/7/2017 1241      9/7/2017             Pulse: 59    Ht Rate: 59    SpO2: 97 %    Resp Rate (set): 10                Electronically Signed By: PARRIS Hart CRNA  September 7, 2017  12:41 PM

## 2017-09-07 NOTE — DISCHARGE INSTRUCTIONS
"[facility] Anesthesia Eye Care Center Discharge  Instructions  Anesthesia (Eye Care Center)   Adult Discharge Instructions    For 24 hours after surgery    1. Get plenty of rest.  Make arrangements to have a responsible adult stay with you for at least 6 hours after you leave the hospital.  2. Do not drive or use heavy equipment for 24 hours.    3. Do not drink alcohol for 24 hours.  4. Do not sign legal documents or make important decisions for 24 hours.  5. Avoid strenuous or risky activities. You may feel lightheaded.  If so, sit for a few minutes before standing.  Have someone help you get up.   6. Conscious sedation patients may resume a regular diet..  7. Any questions of medical nature, call your physician.    Cataract Surgery Postoperative Instructions  Dr. Clemente Sykes      Postoperative Medications: After surgery, you will use eye drop medications. In most cases, you will start these eye drops 2 days before the day of surgery.   Follow the directions provided to you from the pharmacy or from the doctor's office.    The drops might sting a little when they are instilled, and that is normal.    It doesn't matter what order you put the drops into your eyes, but you should wait at least one minute between drops.    Recently we started using a compounded drop that contains all three prescriptions in one bottle. If you have this drop you only need to use one drop 4 time per day. Many people choose to do the drops at breakfast, lunch, dinner, and bedtime.    Artificial Tears- Are lubricating drops used to moisturize the eye.  You can use these as much as you want.  Particularly if your eyes feel watery, gritty, or uncomfortable.  Chilling these drops in the refrigerator results in a more soothing feeling.  There are several brands of artificial tears available including, but not limited to, Optive, Refresh, Systane, Blink, Genteal, Soothe, and others.  You should not use drops that are \"gets the red " "out.\"  You do not need a prescription for these medications.     Please continue any glaucoma, dry eye, or other medications you were using prior to the surgery.      Please allow 24 to 48 hours when requesting refills, and call BEFORE you run out of drops.    Restriction on Activities-  It is extremely important that you DO NOT RUB THE TREATED EYE.    - You will be given a clear plastic shield to wear as protection over your eye the night after surgery.    - Refrain from many activities that may put your eye at risk of injury, as well as areas containing a high volume of chemicals, dust, and debris.    - Do Not wear any eye makeup or moisturizer around the eye for 1 week after surgery.    - Do Not swim or go into a hot-tub, jacuzzi, or sauna for 1 week following surgery.  You can take showers as normal, but avoid getting shampoo or soap into your eyes.     - Avoid strenuous activity, including lifting more than 10 pounds, for 1 week after surgery.       - It is fine to bathe, read, watch TV, and use the computer.    Symptoms requiring medical attention:     - Sudden onset of increased discharge from the eye.  - Persistent or increasing pain in the eye.  - Sudden decrease in vision.  - Persistent nausea or vomiting.      If you have any questions or concerns before or after your surgery, please contact Dr. Sykes's office at [phone].         Revised 3/27/2017  "

## 2017-09-07 NOTE — IP AVS SNAPSHOT
[facility]    [address]    [place] [address]    Phone:  [phone]    Fax:  [phone]                                       After Visit Summary   9/7/2017    Nayeli Byrd    MRN: [id number]           After Visit Summary Signature Page     I have received my discharge instructions, and my questions have been answered. I have discussed any challenges I see with this plan with the nurse or doctor.    ..........................................................................................................................................  Patient/Patient Representative Signature      ..........................................................................................................................................  Patient Representative Print Name and Relationship to Patient    ..................................................               ................................................  Date                                            Time    ..........................................................................................................................................  Reviewed by Signature/Title    ...................................................              ..............................................  Date                                                            Time

## 2017-09-07 NOTE — ANESTHESIA POSTPROCEDURE EVALUATION
Patient: Nayeli Byrd    Procedure(s):  RIGHT EYE PHACOEMULSIFICATION CLEAR CORNEA WITH DELUXE INTRAOCULAR LENS IMPLANT  - Wound Class: I-Clean    Diagnosis:CATARACT   Diagnosis Additional Information: No value filed.    Anesthesia Type:  MAC    Note:  Anesthesia Post Evaluation    Patient location during evaluation: PACU  Patient participation: Able to fully participate in evaluation  Level of consciousness: awake and alert  Pain management: satisfactory to patient  Airway patency: patent  Cardiovascular status: hemodynamically stable  Respiratory status: acceptable and unassisted  Hydration status: balanced  PONV: none     Anesthetic complications: None          Last vitals:  Vitals:    09/07/17 1129 09/07/17 1242 09/07/17 1247   BP: (!) 143/94 (!) 151/101 (!) 146/103   Resp: 16 16 16   SpO2: 99% 94% 94%         Electronically Signed By: Chao Leigh MD  September 7, 2017  1:36 PM

## 2017-09-07 NOTE — ANESTHESIA PREPROCEDURE EVALUATION
Anesthesia Evaluation     . Pt has had prior anesthetic.     No history of anesthetic complications          ROS/MED HX    ENT/Pulmonary:      (-) tobacco use and sleep apnea   Neurologic:      (-) seizures and CVA   Cardiovascular:        (-) hypertension and dyslipidemia   METS/Exercise Tolerance:     Hematologic:         Musculoskeletal:         GI/Hepatic:        (-) GERD   Renal/Genitourinary:         Endo:     (+) Obesity, .      Psychiatric:     (+) psychiatric history depression      Infectious Disease:         Malignancy:         Other:                     Physical Exam  Normal systems: cardiovascular, pulmonary and dental    Airway   Mallampati: I  TM distance: >3 FB  Neck ROM: full    Dental     Cardiovascular       Pulmonary                     Anesthesia Plan      History & Physical Review  History and physical reviewed and following examination; no interval change.    ASA Status:  2 .    NPO Status:  > 8 hours    Plan for MAC Reason for MAC:  Procedure to face, neck, head or breast  PONV prophylaxis:  Ondansetron (or other 5HT-3)       Postoperative Care      Consents  Anesthetic plan, risks, benefits and alternatives discussed with:  Patient..        Procedure: Procedure(s):  PHACOEMULSIFICATION CLEAR CORNEA WITH DELUXE INTRAOCULAR LENS IMPLANT  Preop diagnosis: CATARACT     Allergies   Allergen Reactions     Hctz      Renal insufficiency     Past Medical History:   Diagnosis Date     Colonic polyp      Contact dermatitis and other eczema, due to unspecified cause      Depressive disorder, not elsewhere classified      Fracture, humerus closed 2013    Closed left humeral head fracture.     Hyperlipidemia LDL goal <130       Morbid obesity (H)      Non morbid obesity, unspecified obesity type 2017     Unspecified essential hypertension      Past Surgical History:   Procedure Laterality Date     C/SECTION, LOW TRANSVERSE      , Low Transverse     COLONOSCOPY       EYE SURGERY        ORTHOPEDIC SURGERY      right foot surgery     Prior to Admission medications    Not on File     Current Facility-Administered Medications Ordered in Epic   Medication Dose Route Frequency Last Rate Last Dose     phenylephrine (MYDFRIN /ELVA-SYNEPHRINE) 2.5 % ophthalmic solution 1 drop  1 drop Ophthalmic q5 Min Prior to Surgery   1 drop at 09/07/17 1123     tropicamide (MYDRIACYL) 1 % ophthalmic solution 1 drop  1 drop Ophthalmic q5 Min Prior to Surgery   1 drop at 09/07/17 1123     moxifloxacin (VIGAMOX) 0.5 % ophthalmic solution 1 drop  1 drop Ophthalmic q5 Min Prior to Surgery   1 drop at 09/07/17 1123     diclofenac (VOLTAREN) 0.1 % ophthalmic solution 1 drop  1 drop Ophthalmic q5 Min Prior to Surgery   1 drop at 09/07/17 1123     lidocaine 1 % 1 mL  1 mL Other Q1H PRN         lactated ringers infusion  500 mL Intravenous Continuous         lidocaine 1 % 1 mL  1 mL Other Q1H PRN         sodium chloride (PF) 0.9% PF flush 3 mL  3 mL Intracatheter Q1H PRN         sodium chloride (PF) 0.9% PF flush 3 mL  3 mL Intracatheter Q8H         No current [facility]-ordered outpatient prescriptions on file.     Wt Readings from Last 1 Encounters:   08/31/17 86.2 kg (190 lb)     Temp Readings from Last 1 Encounters:   08/31/17 36.8  C (98.3  F) (Oral)     BP Readings from Last 6 Encounters:   08/31/17 126/80   01/19/15 116/80   03/01/13 134/80   02/17/13 (!) 167/108   10/30/12 110/60   06/28/12 133/93     Pulse Readings from Last 4 Encounters:   08/31/17 79   03/01/13 62   10/30/12 60   04/28/11 65     Resp Readings from Last 1 Encounters:   02/17/13 18     SpO2 Readings from Last 1 Encounters:   08/31/17 100%     Recent Labs   Lab Test  10/30/12   0935   NA  143   POTASSIUM  4.2   CHLORIDE  103   CO2  27   ANIONGAP  13   GLC  85   BUN  14   CR  0.63   JOANNE  8.9     Recent Labs   Lab Test  10/30/12   0935   WBC  6.2   HGB  15.5   PLT  190     No results for input(s): INR in the last 16481 hours.    Invalid input(s): APTT   RECENT  LABS:   ECG:   ECHO:   CXR:                      .

## 2017-09-13 DIAGNOSIS — E66.9 NON MORBID OBESITY, UNSPECIFIED OBESITY TYPE: ICD-10-CM

## 2017-09-13 DIAGNOSIS — Z13.6 CARDIOVASCULAR SCREENING; LDL GOAL LESS THAN 130: ICD-10-CM

## 2017-09-13 DIAGNOSIS — Z13.1 SCREENING FOR DIABETES MELLITUS: Primary | ICD-10-CM

## 2017-09-13 LAB
ALBUMIN SERPL-MCNC: 3.5 G/DL (ref 3.4–5)
ALP SERPL-CCNC: 100 U/L (ref 40–150)
ALT SERPL W P-5'-P-CCNC: 24 U/L (ref 0–50)
ANION GAP SERPL CALCULATED.3IONS-SCNC: 9 MMOL/L (ref 3–14)
AST SERPL W P-5'-P-CCNC: 15 U/L (ref 0–45)
BILIRUB SERPL-MCNC: 0.6 MG/DL (ref 0.2–1.3)
BUN SERPL-MCNC: 24 MG/DL (ref 7–30)
CALCIUM SERPL-MCNC: 9 MG/DL (ref 8.5–10.1)
CHLORIDE SERPL-SCNC: 105 MMOL/L (ref 94–109)
CHOLEST SERPL-MCNC: 232 MG/DL
CO2 SERPL-SCNC: 27 MMOL/L (ref 20–32)
CREAT SERPL-MCNC: 0.94 MG/DL (ref 0.52–1.04)
GFR SERPL CREATININE-BSD FRML MDRD: 60 ML/MIN/1.7M2
GLUCOSE SERPL-MCNC: 93 MG/DL (ref 70–99)
HDLC SERPL-MCNC: 79 MG/DL
LDLC SERPL CALC-MCNC: 137 MG/DL
NONHDLC SERPL-MCNC: 153 MG/DL
POTASSIUM SERPL-SCNC: 4 MMOL/L (ref 3.4–5.3)
PROT SERPL-MCNC: 7.6 G/DL (ref 6.8–8.8)
SODIUM SERPL-SCNC: 141 MMOL/L (ref 133–144)
TRIGL SERPL-MCNC: 82 MG/DL

## 2017-09-13 PROCEDURE — 80053 COMPREHEN METABOLIC PANEL: CPT | Performed by: INTERNAL MEDICINE

## 2017-09-13 PROCEDURE — 36415 COLL VENOUS BLD VENIPUNCTURE: CPT | Performed by: INTERNAL MEDICINE

## 2017-09-13 PROCEDURE — 80061 LIPID PANEL: CPT | Performed by: INTERNAL MEDICINE

## 2017-09-13 NOTE — LETTER
"[facility]  [address] [address]  [phone]      9/9/2018       Nayeli Byrd  [facility] [place] [address]        Dear Nayeli,  Review of your chart shows you are due for laboratory monitoring studies to follow-up on your history of  mild cholesterol elevation and diabetes screening   Call our appointment desk at [phone] or use [employer] to schedule a  fasting  \"lab only\"  appointment  in the next 1-2 months  For fasting labs, please refrain from eating for 8 hours or more. You may drink water and take medications.     Sincerely,      Wing Olivia MD  Internal Medicine      "

## 2017-09-20 RX ORDER — DICLOFENAC SODIUM 1 MG/ML
1 SOLUTION/ DROPS OPHTHALMIC
Status: CANCELLED | OUTPATIENT
Start: 2017-09-20

## 2017-09-20 RX ORDER — PHENYLEPHRINE HYDROCHLORIDE 25 MG/ML
1 SOLUTION/ DROPS OPHTHALMIC
Status: CANCELLED | OUTPATIENT
Start: 2017-09-20

## 2017-09-20 RX ORDER — PROPARACAINE HYDROCHLORIDE 5 MG/ML
1 SOLUTION/ DROPS OPHTHALMIC ONCE
Status: CANCELLED | OUTPATIENT
Start: 2017-09-20 | End: 2017-09-20

## 2017-09-20 RX ORDER — MOXIFLOXACIN 5 MG/ML
1 SOLUTION/ DROPS OPHTHALMIC
Status: CANCELLED | OUTPATIENT
Start: 2017-09-20

## 2017-09-20 RX ORDER — TROPICAMIDE 10 MG/ML
1 SOLUTION/ DROPS OPHTHALMIC
Status: CANCELLED | OUTPATIENT
Start: 2017-09-20

## 2017-09-20 NOTE — H&P (VIEW-ONLY)
[address] [address]  [phone]  Dept: [phone]    PRE-OP EVALUATION:  Today's date: 2017    Nayeli Byrd (: 1955) presents for pre-operative evaluation assessment as requested by Dr. Sykes.  She requires evaluation and anesthesia risk assessment prior to undergoing surgery/procedure for treatment of Catatracts .  Proposed procedure: Removal of Catatracts    Date of Surgery/ Procedure: 2017 and 2017  Time of Surgery/ Procedure: 12:30pm  Hospital/Surgical Facility: [facility]  Primary Physician: Wing Olivia  Type of Anesthesia Anticipated: local    Patient has a Health Care Directive or Living Will:  YES     Preop Questions 2017   1.  Do you have a history of heart attack, stroke, stent, bypass or surgery on an artery in the head, neck, heart or legs? No   2.  Do you ever have any pain or discomfort in your chest? No   3.  Do you have a history of  Heart Failure? No   4.   Are you troubled by shortness of breath when:  walking on a level surface, or up a slight hill, or at night? No   5.  Do you currently have a cold, bronchitis or other respiratory infection? No   6.  Do you have a cough, shortness of breath, or wheezing? No   7.  Do you sometimes get pains in the calves of your legs when you walk? No   8. Do you or anyone in your family have previous history of blood clots? No   9.  Do you or does anyone in your family have a serious bleeding problem such as prolonged bleeding following surgeries or cuts? No   10. Have you ever had problems with anemia or been told to take iron pills? No   11. Have you had any abnormal blood loss such as black, tarry or bloody stools, or abnormal vaginal bleeding? No   12. Have you ever had a blood transfusion? No   13. Have you or any of your relatives ever had problems with anesthesia? No   14. Do you have sleep apnea, excessive snoring or daytime drowsiness? No   15.  Do you have any prosthetic heart valves? No   16. Do you have prosthetic joints? No   17. Is there any chance that you may be pregnant? No           HPI:                                                      Brief HPI related to upcoming procedure: bilateral cataracts with clouding of vision and trouble driving at night. Presents for clearance leilani undergo surgical correction. See below for other medical issues. Due for pap/pelvbic and colonoscopy with hx polyp 5 years ago. Due for mammogram screening.            MEDICAL HISTORY:                                                    Patient Active Problem List    Diagnosis Date Noted     Pain in shoulder 2013     Priority: Medium     Advanced directives, counseling/discussion 10/30/2012     Priority: Medium     Discussed advance care planning with patient; however, patient declined at this time. 10/30/2012          Colonic polyp      Priority: Medium     HYPERLIPIDEMIA LDL GOAL <130 10/31/2010     Priority: Medium      Past Medical History:   Diagnosis Date     Colonic polyp      Contact dermatitis and other eczema, due to unspecified cause      Depressive disorder, not elsewhere classified      Fracture, humerus closed 2013    Closed left humeral head fracture.     Hyperlipidemia LDL goal <130       Morbid obesity (H)      Unspecified essential hypertension      Past Surgical History:   Procedure Laterality Date     C/SECTION, LOW TRANSVERSE      , Low Transverse     EYE SURGERY       ORTHOPEDIC SURGERY      right foot surgery     No current outpatient prescriptions on file.     OTC products: none    Allergies   Allergen Reactions     Hctz      Renal insufficiency      Latex Allergy: NO    Social History   Substance Use Topics     Smoking status: Never Smoker     Smokeless tobacco: Never Used     Alcohol use Yes      Comment: 2 per day     History   Drug Use No       REVIEW OF SYSTEMS:                                                    C: NEGATIVE for  fever, chills. Weight up 32 pounds in 3 years after previous weight loss  I: NEGATIVE for worrisome rashes, moles or lesions  E: See above  E/M: NEGATIVE for ear, mouth and throat problems  R: NEGATIVE for significant cough or SOB  B: NEGATIVE for masses, tenderness or discharge  CV: NEGATIVE for chest pain, palpitations or peripheral edema  GI: NEGATIVE for nausea, abdominal pain, heartburn, or change in bowel habits  : NEGATIVE for frequency, dysuria, or hematuria  M: NEGATIVE for significant arthralgias or myalgia  N: NEGATIVE for weakness, dizziness or paresthesias  E: NEGATIVE for temperature intolerance, skin/hair changes. POSITIVE for obesity  H: NEGATIVE for bleeding problems  P: NEGATIVE for changes in mood or affect    EXAM:                                                    /80  Pulse 79  Temp 98.3  F (36.8  C) (Oral)  Wt 190 lb (86.2 kg)  LMP 02/25/2004  SpO2 100%  BMI 34.47 kg/m2  Eye: PERRL, EOMI. Bilateral cataracts  HENT: ear canals and TM's normal and nose and mouth without ulcers or lesions   Neck: no adenopathy. Thyroid normal to palpation. No bruits  Pulm: Lungs clear to auscultation   CV: Regular rates and rhythm  GI: Soft, obese,  nontender, Normal active bowel sounds, No hepatosplenomegaly or masses palpable  Ext: Peripheral pulses intact. No edema.  Neuro: Normal strength and tone, sensory exam grossly normal      DIAGNOSTICS:                                                    No labs or EKG required for low risk surgery (cataract)    Recent Labs   Lab Test  10/30/12   0935   HGB  15.5   PLT  190   NA  143   POTASSIUM  4.2   CR  0.63        IMPRESSION:                                                    Reason for surgery/procedure:  Bilateral cataracts  Diagnosis/reason for consult:  Obesity, history of colon polyp     The proposed surgical procedure is considered LOW risk.    REVISED CARDIAC RISK INDEX  The patient has the following serious cardiovascular risks for  perioperative complications such as (MI, PE, VFib and 3  AV Block):  No serious cardiac risks  INTERPRETATION: 0 risks: Class I (very low risk - 0.4% complication rate)    The patient has the following additional risks for perioperative complications:  No identified additional risks         RECOMMENDATIONS:                                                      APPROVAL GIVEN to proceed with proposed procedure, without further diagnostic evaluation     PLAN:  No solid food after  midnight prior to surgery. May have clear liquids up to 5 hours prior to surgery  Appt with myself or GYN dept for pap/pelvic exam  Call  [phone] or use [facility] to schedule a future lab appointment  fasting in the next 1 month  Calorie/carbohydrate (sugar, bread, potato, pasta, etc) reduction in diet for weight loss.  Increase color on your plate with fruits and vegetables. Increase  frequency of walking or other aerobic exercise as able (goal is daily)  Mammogram  [place]  Colonoscopy  with  [facility]. They will call to schedule. If not heard from them in 1 week, then call [phone].            Signed Electronically by: Wing Olivia MD    Copy of this evaluation report is provided to requesting physician.    [facility] Preop Guidelines

## 2017-09-21 ENCOUNTER — ANESTHESIA EVENT (OUTPATIENT)
Dept: SURGERY | Facility: CLINIC | Age: 62
End: 2017-09-21
Payer: COMMERCIAL

## 2017-09-21 ENCOUNTER — HOSPITAL ENCOUNTER (OUTPATIENT)
Facility: CLINIC | Age: 62
Discharge: HOME | End: 2017-09-21
Attending: OPHTHALMOLOGY | Admitting: OPHTHALMOLOGY
Payer: COMMERCIAL

## 2017-09-21 ENCOUNTER — ANESTHESIA (OUTPATIENT)
Dept: SURGERY | Facility: CLINIC | Age: 62
End: 2017-09-21
Payer: COMMERCIAL

## 2017-09-21 VITALS
TEMPERATURE: 97.3 F | OXYGEN SATURATION: 97 % | SYSTOLIC BLOOD PRESSURE: 134 MMHG | DIASTOLIC BLOOD PRESSURE: 82 MMHG | RESPIRATION RATE: 16 BRPM | HEART RATE: 59 BPM

## 2017-09-21 PROCEDURE — 37000008 ZZH ANESTHESIA TECHNICAL FEE, 1ST 30 MIN: Performed by: OPHTHALMOLOGY

## 2017-09-21 PROCEDURE — 25000128 H RX IP 250 OP 636: Performed by: NURSE ANESTHETIST, CERTIFIED REGISTERED

## 2017-09-21 PROCEDURE — 25000125 ZZHC RX 250: Performed by: OPHTHALMOLOGY

## 2017-09-21 PROCEDURE — V2632 POST CHMBR INTRAOCULAR LENS: HCPCS | Performed by: OPHTHALMOLOGY

## 2017-09-21 PROCEDURE — 40000170 ZZH STATISTIC PRE-PROCEDURE ASSESSMENT II: Performed by: OPHTHALMOLOGY

## 2017-09-21 PROCEDURE — V2788 PRESBYOPIA-CORRECT FUNCTION: HCPCS | Performed by: OPHTHALMOLOGY

## 2017-09-21 PROCEDURE — 25000128 H RX IP 250 OP 636: Performed by: OPHTHALMOLOGY

## 2017-09-21 PROCEDURE — 25000125 ZZHC RX 250: Performed by: ANESTHESIOLOGY

## 2017-09-21 PROCEDURE — 36000101 ZZH EYE SURGERY LEVEL 3 1ST 30 MIN: Performed by: OPHTHALMOLOGY

## 2017-09-21 PROCEDURE — 27210794 ZZH OR GENERAL SUPPLY STERILE: Performed by: OPHTHALMOLOGY

## 2017-09-21 PROCEDURE — 36000135 ZZH KERATOTOMY ARCUATE W FEMTOSECOND LASER/IMAGING FOR ATIOL: Performed by: OPHTHALMOLOGY

## 2017-09-21 PROCEDURE — 71000028 ZZH EYE RECOVERY PHASE 2 EACH 15 MINS: Performed by: OPHTHALMOLOGY

## 2017-09-21 PROCEDURE — 25000128 H RX IP 250 OP 636: Performed by: ANESTHESIOLOGY

## 2017-09-21 DEVICE — IMPLANTABLE DEVICE: Type: IMPLANTABLE DEVICE | Site: EYE | Status: FUNCTIONAL

## 2017-09-21 RX ORDER — PHENYLEPHRINE HYDROCHLORIDE 25 MG/ML
1 SOLUTION/ DROPS OPHTHALMIC
Status: COMPLETED | OUTPATIENT
Start: 2017-09-21 | End: 2017-09-21

## 2017-09-21 RX ORDER — FENTANYL CITRATE 50 UG/ML
INJECTION, SOLUTION INTRAMUSCULAR; INTRAVENOUS PRN
Status: DISCONTINUED | OUTPATIENT
Start: 2017-09-21 | End: 2017-09-21

## 2017-09-21 RX ORDER — SODIUM CHLORIDE, SODIUM LACTATE, POTASSIUM CHLORIDE, CALCIUM CHLORIDE 600; 310; 30; 20 MG/100ML; MG/100ML; MG/100ML; MG/100ML
500 INJECTION, SOLUTION INTRAVENOUS CONTINUOUS
Status: DISCONTINUED | OUTPATIENT
Start: 2017-09-21 | End: 2017-09-21 | Stop reason: HOSPADM

## 2017-09-21 RX ORDER — LIDOCAINE HYDROCHLORIDE 10 MG/ML
INJECTION, SOLUTION EPIDURAL; INFILTRATION; INTRACAUDAL; PERINEURAL PRN
Status: DISCONTINUED | OUTPATIENT
Start: 2017-09-21 | End: 2017-09-21 | Stop reason: HOSPADM

## 2017-09-21 RX ORDER — TROPICAMIDE 10 MG/ML
1 SOLUTION/ DROPS OPHTHALMIC
Status: COMPLETED | OUTPATIENT
Start: 2017-09-21 | End: 2017-09-21

## 2017-09-21 RX ORDER — PROPARACAINE HYDROCHLORIDE 5 MG/ML
1 SOLUTION/ DROPS OPHTHALMIC ONCE
Status: COMPLETED | OUTPATIENT
Start: 2017-09-21 | End: 2017-09-21

## 2017-09-21 RX ORDER — PROPARACAINE HYDROCHLORIDE 5 MG/ML
SOLUTION/ DROPS OPHTHALMIC PRN
Status: DISCONTINUED | OUTPATIENT
Start: 2017-09-21 | End: 2017-09-21 | Stop reason: HOSPADM

## 2017-09-21 RX ORDER — BALANCED SALT SOLUTION 6.4; .75; .48; .3; 3.9; 1.7 MG/ML; MG/ML; MG/ML; MG/ML; MG/ML; MG/ML
SOLUTION OPHTHALMIC PRN
Status: DISCONTINUED | OUTPATIENT
Start: 2017-09-21 | End: 2017-09-21 | Stop reason: HOSPADM

## 2017-09-21 RX ORDER — MOXIFLOXACIN 5 MG/ML
1 SOLUTION/ DROPS OPHTHALMIC
Status: COMPLETED | OUTPATIENT
Start: 2017-09-21 | End: 2017-09-21

## 2017-09-21 RX ORDER — TETRACAINE HYDROCHLORIDE 5 MG/ML
SOLUTION OPHTHALMIC PRN
Status: DISCONTINUED | OUTPATIENT
Start: 2017-09-21 | End: 2017-09-21 | Stop reason: HOSPADM

## 2017-09-21 RX ORDER — DICLOFENAC SODIUM 1 MG/ML
1 SOLUTION/ DROPS OPHTHALMIC
Status: COMPLETED | OUTPATIENT
Start: 2017-09-21 | End: 2017-09-21

## 2017-09-21 RX ADMIN — MOXIFLOXACIN 1 DROP: 5 SOLUTION/ DROPS OPHTHALMIC at 06:18

## 2017-09-21 RX ADMIN — LIDOCAINE HYDROCHLORIDE 1 ML: 10 INJECTION, SOLUTION EPIDURAL; INFILTRATION; INTRACAUDAL; PERINEURAL at 06:45

## 2017-09-21 RX ADMIN — MOXIFLOXACIN 1 DROP: 5 SOLUTION/ DROPS OPHTHALMIC at 06:27

## 2017-09-21 RX ADMIN — PHENYLEPHRINE HYDROCHLORIDE 1 DROP: 2.5 SOLUTION/ DROPS OPHTHALMIC at 06:41

## 2017-09-21 RX ADMIN — SODIUM CHLORIDE, SODIUM LACTATE, POTASSIUM CHLORIDE, CALCIUM CHLORIDE: 600; 310; 30; 20 INJECTION, SOLUTION INTRAVENOUS at 06:01

## 2017-09-21 RX ADMIN — MOXIFLOXACIN 1 DROP: 5 SOLUTION/ DROPS OPHTHALMIC at 06:41

## 2017-09-21 RX ADMIN — TROPICAMIDE 1 DROP: 10 SOLUTION/ DROPS OPHTHALMIC at 06:18

## 2017-09-21 RX ADMIN — PHENYLEPHRINE HYDROCHLORIDE 1 DROP: 2.5 SOLUTION/ DROPS OPHTHALMIC at 06:27

## 2017-09-21 RX ADMIN — TROPICAMIDE 1 DROP: 10 SOLUTION/ DROPS OPHTHALMIC at 06:41

## 2017-09-21 RX ADMIN — FENTANYL CITRATE 50 MCG: 50 INJECTION, SOLUTION INTRAMUSCULAR; INTRAVENOUS at 07:39

## 2017-09-21 RX ADMIN — FENTANYL CITRATE 50 MCG: 50 INJECTION, SOLUTION INTRAMUSCULAR; INTRAVENOUS at 07:37

## 2017-09-21 RX ADMIN — PHENYLEPHRINE HYDROCHLORIDE 1 DROP: 2.5 SOLUTION/ DROPS OPHTHALMIC at 06:18

## 2017-09-21 RX ADMIN — DICLOFENAC SODIUM 1 DROP: 1 SOLUTION/ DROPS OPHTHALMIC at 06:42

## 2017-09-21 RX ADMIN — TROPICAMIDE 1 DROP: 10 SOLUTION/ DROPS OPHTHALMIC at 06:27

## 2017-09-21 RX ADMIN — MIDAZOLAM HYDROCHLORIDE 2 MG: 1 INJECTION, SOLUTION INTRAMUSCULAR; INTRAVENOUS at 07:37

## 2017-09-21 RX ADMIN — DICLOFENAC SODIUM 1 DROP: 1 SOLUTION/ DROPS OPHTHALMIC at 06:18

## 2017-09-21 RX ADMIN — DICLOFENAC SODIUM 1 DROP: 1 SOLUTION/ DROPS OPHTHALMIC at 06:27

## 2017-09-21 RX ADMIN — PROPARACAINE HYDROCHLORIDE 1 DROP: 5 SOLUTION/ DROPS OPHTHALMIC at 06:18

## 2017-09-21 NOTE — ANESTHESIA POSTPROCEDURE EVALUATION
Patient: Nayeli Byrd    Procedure(s):  LEFT EYE  FEMTOSECOND LASER ASSISTED PHACOEMULSIFICATION CLEAR CORNEA WITH DELUXE INTRAOCULAR LENS IMPLANT  - Wound Class: I-Clean    Diagnosis:CATARACT   Diagnosis Additional Information: No value filed.    Anesthesia Type:  MAC    Note:  Anesthesia Post Evaluation    Patient location during evaluation: PACU  Patient participation: Able to fully participate in evaluation  Level of consciousness: awake  Pain management: adequate  Airway patency: patent  Cardiovascular status: acceptable  Respiratory status: acceptable  Hydration status: acceptable  PONV: none     Anesthetic complications: None          Last vitals:  Vitals:    09/21/17 0621 09/21/17 0800   BP: 136/83 134/82   Pulse:  59   Resp: 16 16   Temp: 36.3  C (97.3  F)    SpO2: 96% 97%         Electronically Signed By: Layne Núñez MD  September 21, 2017  9:29 AM

## 2017-09-21 NOTE — IP AVS SNAPSHOT
[facility]    [address]    [place] [address]    Phone:  [phone]    Fax:  [phone]                                       After Visit Summary   9/21/2017    Nayeli Byrd    MRN: [id number]           After Visit Summary Signature Page     I have received my discharge instructions, and my questions have been answered. I have discussed any challenges I see with this plan with the nurse or doctor.    ..........................................................................................................................................  Patient/Patient Representative Signature      ..........................................................................................................................................  Patient Representative Print Name and Relationship to Patient    ..................................................               ................................................  Date                                            Time    ..........................................................................................................................................  Reviewed by Signature/Title    ...................................................              ..............................................  Date                                                            Time

## 2017-09-21 NOTE — DISCHARGE INSTRUCTIONS
"Cataract Surgery Postoperative Instructions  Dr. Clemente Sykes      Postoperative Medications: After surgery, you will use eye drop medications. In most cases, you will start these eye drops 2 days before the day of surgery.   Follow the directions provided to you from the pharmacy or from the doctor's office.    The drops might sting a little when they are instilled, and that is normal.    It doesn't matter what order you put the drops into your eyes, but you should wait at least one minute between drops.    Recently we started using a compounded drop that contains all three prescriptions in one bottle. If you have this drop you only need to use one drop 4 time per day. Many people choose to do the drops at breakfast, lunch, dinner, and bedtime.    Artificial Tears- Are lubricating drops used to moisturize the eye.  You can use these as much as you want.  Particularly if your eyes feel watery, gritty, or uncomfortable.  Chilling these drops in the refrigerator results in a more soothing feeling.  There are several brands of artificial tears available including, but not limited to, Optive, Refresh, Systane, Blink, Genteal, Soothe, and others.  You should not use drops that are \"gets the red out.\"  You do not need a prescription for these medications.     Please continue any glaucoma, dry eye, or other medications you were using prior to the surgery.      Please allow 24 to 48 hours when requesting refills, and call BEFORE you run out of drops.    Restriction on Activities-  It is extremely important that you DO NOT RUB THE TREATED EYE.    - You will be given a clear plastic shield to wear as protection over your eye the night after surgery.    - Refrain from many activities that may put your eye at risk of injury, as well as areas containing a high volume of chemicals, dust, and debris.    - Do Not wear any eye makeup or moisturizer around the eye for 1 week after surgery.    - Do Not swim or go into a hot-tub, " jacuzzi, or sauna for 1 week following surgery.  You can take showers as normal, but avoid getting shampoo or soap into your eyes.     - Avoid strenuous activity, including lifting more than 10 pounds, for 1 week after surgery.       - It is fine to bathe, read, watch TV, and use the computer.    Symptoms requiring medical attention:     - Sudden onset of increased discharge from the eye.  - Persistent or increasing pain in the eye.  - Sudden decrease in vision.  - Persistent nausea or vomiting.      If you have any questions or concerns before or after your surgery, please contact Dr. Sykes's office at [phone].      [facility]esthesia [facility] Discharge  Instructions  Anesthesia (Eye Care Center)   Adult Discharge Instructions    For 24 hours after surgery    1. Get plenty of rest.  Make arrangements to have a responsible adult stay with you for at least 6 hours after you leave the hospital.  2. Do not drive or use heavy equipment for 24 hours.    3. Do not drink alcohol for 24 hours.  4. Do not sign legal documents or make important decisions for 24 hours.  5. Avoid strenuous or risky activities. You may feel lightheaded.  If so, sit for a few minutes before standing.  Have someone help you get up.   6. Conscious sedation patients may resume a regular diet..  Any questions of medical nature, call your physician.Cataract Surgery Postoperative Instructions  Dr. Clemente Sykes      Postoperative Medications: After surgery, you will use eye drop medications. In most cases, you will start these eye drops 2 days before the day of surgery.   Follow the directions provided to you from the pharmacy or from the doctor's office.    The drops might sting a little when they are instilled, and that is normal.    It doesn't matter what order you put the drops into your eyes, but you should wait at least one minute between drops.    Recently we started using a compounded drop that contains all  "three prescriptions in one bottle. If you have this drop you only need to use one drop 4 time per day. Many people choose to do the drops at breakfast, lunch, dinner, and bedtime.    Artificial Tears- Are lubricating drops used to moisturize the eye.  You can use these as much as you want.  Particularly if your eyes feel watery, gritty, or uncomfortable.  Chilling these drops in the refrigerator results in a more soothing feeling.  There are several brands of artificial tears available including, but not limited to, Optive, Refresh, Systane, Blink, Genteal, Soothe, and others.  You should not use drops that are \"gets the red out.\"  You do not need a prescription for these medications.     Please continue any glaucoma, dry eye, or other medications you were using prior to the surgery.      Please allow 24 to 48 hours when requesting refills, and call BEFORE you run out of drops.    Restriction on Activities-  It is extremely important that you DO NOT RUB THE TREATED EYE.    - You will be given a clear plastic shield to wear as protection over your eye the night after surgery.    - Refrain from many activities that may put your eye at risk of injury, as well as areas containing a high volume of chemicals, dust, and debris.    - Do Not wear any eye makeup or moisturizer around the eye for 1 week after surgery.    - Do Not swim or go into a hot-tub, jacuzzi, or sauna for 1 week following surgery.  You can take showers as normal, but avoid getting shampoo or soap into your eyes.     - Avoid strenuous activity, including lifting more than 10 pounds, for 1 week after surgery.       - It is fine to bathe, read, watch TV, and use the computer.    Symptoms requiring medical attention:     - Sudden onset of increased discharge from the eye.  - Persistent or increasing pain in the eye.  - Sudden decrease in vision.  - Persistent nausea or vomiting.      If you have any questions or concerns before or after your surgery, please " "contact Dr. Sykes's office at [phone].         Revised 3/27/2017Cataract Surgery Postoperative Instructions  Dr. Clemente Sykes      Postoperative Medications: After surgery, you will use eye drop medications. In most cases, you will start these eye drops 2 days before the day of surgery.   Follow the directions provided to you from the pharmacy or from the doctor's office.    The drops might sting a little when they are instilled, and that is normal.    It doesn't matter what order you put the drops into your eyes, but you should wait at least one minute between drops.    Recently we started using a compounded drop that contains all three prescriptions in one bottle. If you have this drop you only need to use one drop 4 time per day. Many people choose to do the drops at breakfast, lunch, dinner, and bedtime.    Artificial Tears- Are lubricating drops used to moisturize the eye.  You can use these as much as you want.  Particularly if your eyes feel watery, gritty, or uncomfortable.  Chilling these drops in the refrigerator results in a more soothing feeling.  There are several brands of artificial tears available including, but not limited to, Optive, Refresh, Systane, Blink, Genteal, Soothe, and others.  You should not use drops that are \"gets the red out.\"  You do not need a prescription for these medications.     Please continue any glaucoma, dry eye, or other medications you were using prior to the surgery.      Please allow 24 to 48 hours when requesting refills, and call BEFORE you run out of drops.    Restriction on Activities-  It is extremely important that you DO NOT RUB THE TREATED EYE.    - You will be given a clear plastic shield to wear as protection over your eye the night after surgery.    - Refrain from many activities that may put your eye at risk of injury, as well as areas containing a high volume of chemicals, dust, and debris.    - Do Not wear any eye makeup or moisturizer " "around the eye for 1 week after surgery.    - Do Not swim or go into a hot-tub, jacuzzi, or sauna for 1 week following surgery.  You can take showers as normal, but avoid getting shampoo or soap into your eyes.     - Avoid strenuous activity, including lifting more than 10 pounds, for 1 week after surgery.       - It is fine to bathe, read, watch TV, and use the computer.    Symptoms requiring medical attention:     - Sudden onset of increased discharge from the eye.  - Persistent or increasing pain in the eye.  - Sudden decrease in vision.  - Persistent nausea or vomiting.      If you have any questions or concerns before or after your surgery, please contact Dr. Sykes's office at [phone]. 7. Revised 3/27/2017   Cataract Surgery Postoperative Instructions  Dr. Clemente Sykes      Postoperative Medications: After surgery, you will use eye drop medications. In most cases, you will start these eye drops 2 days before the day of surgery.   Follow the directions provided to you from the pharmacy or from the doctor's office.    The drops might sting a little when they are instilled, and that is normal.    It doesn't matter what order you put the drops into your eyes, but you should wait at least one minute between drops.    Recently we started using a compounded drop that contains all three prescriptions in one bottle. If you have this drop you only need to use one drop 4 time per day. Many people choose to do the drops at breakfast, lunch, dinner, and bedtime.    Artificial Tears- Are lubricating drops used to moisturize the eye.  You can use these as much as you want.  Particularly if your eyes feel watery, gritty, or uncomfortable.  Chilling these drops in the refrigerator results in a more soothing feeling.  There are several brands of artificial tears available including, but not limited to, Optive, Refresh, Systane, Blink, Genteal, Soothe, and others.  You should not use drops that are \"gets " "the red out.\"  You do not need a prescription for these medications.     Please continue any glaucoma, dry eye, or other medications you were using prior to the surgery.      Please allow 24 to 48 hours when requesting refills, and call BEFORE you run out of drops.    Restriction on Activities-  It is extremely important that you DO NOT RUB THE TREATED EYE.    - You will be given a clear plastic shield to wear as protection over your eye the night after surgery.    - Refrain from many activities that may put your eye at risk of injury, as well as areas containing a high volume of chemicals, dust, and debris.    - Do Not wear any eye makeup or moisturizer around the eye for 1 week after surgery.    - Do Not swim or go into a hot-tub, jacuzzi, or sauna for 1 week following surgery.  You can take showers as normal, but avoid getting shampoo or soap into your eyes.     - Avoid strenuous activity, including lifting more than 10 pounds, for 1 week after surgery.       - It is fine to bathe, read, watch TV, and use the computer.    Symptoms requiring medical attention:     - Sudden onset of increased discharge from the eye.  - Persistent or increasing pain in the eye.  - Sudden decrease in vision.  - Persistent nausea or vomiting.      If you have any questions or concerns before or after your surgery, please contact Dr. Sykes's office at [phone].         Revised 3/27/2017Cataract Surgery Postoperative Instructions  Dr. Clemente Sykes      Postoperative Medications: After surgery, you will use eye drop medications. In most cases, you will start these eye drops 2 days before the day of surgery.   Follow the directions provided to you from the pharmacy or from the doctor's office.    The drops might sting a little when they are instilled, and that is normal.    It doesn't matter what order you put the drops into your eyes, but you should wait at least one minute between drops.    Recently we started using a " "compounded drop that contains all three prescriptions in one bottle. If you have this drop you only need to use one drop 4 time per day. Many people choose to do the drops at breakfast, lunch, dinner, and bedtime.    Artificial Tears- Are lubricating drops used to moisturize the eye.  You can use these as much as you want.  Particularly if your eyes feel watery, gritty, or uncomfortable.  Chilling these drops in the refrigerator results in a more soothing feeling.  There are several brands of artificial tears available including, but not limited to, Optive, Refresh, Systane, Blink, Genteal, Soothe, and others.  You should not use drops that are \"gets the red out.\"  You do not need a prescription for these medications.     Please continue any glaucoma, dry eye, or other medications you were using prior to the surgery.      Please allow 24 to 48 hours when requesting refills, and call BEFORE you run out of drops.    Restriction on Activities-  It is extremely important that you DO NOT RUB THE TREATED EYE.    - You will be given a clear plastic shield to wear as protection over your eye the night after surgery.    - Refrain from many activities that may put your eye at risk of injury, as well as areas containing a high volume of chemicals, dust, and debris.    - Do Not wear any eye makeup or moisturizer around the eye for 1 week after surgery.    - Do Not swim or go into a hot-tub, jacuzzi, or sauna for 1 week following surgery.  You can take showers as normal, but avoid getting shampoo or soap into your eyes.     - Avoid strenuous activity, including lifting more than 10 pounds, for 1 week after surgery.       - It is fine to bathe, read, watch TV, and use the computer.    Symptoms requiring medical attention:     - Sudden onset of increased discharge from the eye.  - Persistent or increasing pain in the eye.  - Sudden decrease in vision.  - Persistent nausea or vomiting.      If you have any questions or concerns " before or after your surgery, please contact Dr. Sykes's office at [phone].         Revised 3/27/2017    Revised 3/27/2017[facility] Anesthesia [facility] Discharge  Instructions  Anesthesia (Eye Care Center)   Adult Discharge Instructions    For 24 hours after surgery    8. Get plenty of rest.  Make arrangements to have a responsible adult stay with you for at least 6 hours after you leave the hospital.  9. Do not drive or use heavy equipment for 24 hours.    10. Do not drink alcohol for 24 hours.  11. Do not sign legal documents or make important decisions for 24 hours.  12. Avoid strenuous or risky activities. You may feel lightheaded.  If so, sit for a few minutes before standing.  Have someone help you get up.   13. Conscious sedation patients may resume a regular diet..  14. Any questions of medical nature, call your physician.    Cataract Surgery Postoperative Instructions  Dr. Clemente Sykes      Postoperative Medications: After surgery, you will use eye drop medications. In most cases, you will start these eye drops 2 days before the day of surgery.   Follow the directions provided to you from the pharmacy or from the doctor's office.    The drops might sting a little when they are instilled, and that is normal.    It doesn't matter what order you put the drops into your eyes, but you should wait at least one minute between drops.    Recently we started using a compounded drop that contains all three prescriptions in one bottle. If you have this drop you only need to use one drop 4 time per day. Many people choose to do the drops at breakfast, lunch, dinner, and bedtime.    Artificial Tears- Are lubricating drops used to moisturize the eye.  You can use these as much as you want.  Particularly if your eyes feel watery, gritty, or uncomfortable.  Chilling these drops in the refrigerator results in a more soothing feeling.  There are several brands of artificial tears available  "including, but not limited to, Optive, Refresh, Systane, Blink, Genteal, Soothe, and others.  You should not use drops that are \"gets the red out.\"  You do not need a prescription for these medications.     Please continue any glaucoma, dry eye, or other medications you were using prior to the surgery.      Please allow 24 to 48 hours when requesting refills, and call BEFORE you run out of drops.    Restriction on Activities-  It is extremely important that you DO NOT RUB THE TREATED EYE.    - You will be given a clear plastic shield to wear as protection over your eye the night after surgery.    - Refrain from many activities that may put your eye at risk of injury, as well as areas containing a high volume of chemicals, dust, and debris.    - Do Not wear any eye makeup or moisturizer around the eye for 1 week after surgery.    - Do Not swim or go into a hot-tub, jacuzzi, or sauna for 1 week following surgery.  You can take showers as normal, but avoid getting shampoo or soap into your eyes.     - Avoid strenuous activity, including lifting more than 10 pounds, for 1 week after surgery.       - It is fine to bathe, read, watch TV, and use the computer.    Symptoms requiring medical attention:     - Sudden onset of increased discharge from the eye.  - Persistent or increasing pain in the eye.  - Sudden decrease in vision.  - Persistent nausea or vomiting.      If you have any questions or concerns before or after your surgery, please contact Dr. Sykes's office at [phone].         Revised 3/27/2017  "

## 2017-09-21 NOTE — OP NOTE
PATIENT NAME:  Nayeli Byrd    :  1955    PATIENT NUMBER:  [id number]    DATE OF SURGERY:  2017    SURGEON:  Clemente Sykes M.D.    PREOPERATIVE DIAGNOSIS:   1. Cataract left eye  2. Astigmatism left eye    POSTOPERATIVE DIAGNOSIS:  Same    PROCEDURE:   1. Phacoemulusification of cataract with intraocular lens implant left eye.  2. Femtosecond LASER incisions (astigmatic correction) for cataract surgery left eye and nuclear softening.    DETAILS: The patient was brought to the LASER suite. In a supine position the head was secured and the docking apparatus was applied to the eye. When good suction was achieved BSS was added to the well. The chair was then positioned underneath the LASER and docking was successfully achieved. OCT scanning was initiated and approved. The LASER was then used to deliver the desired incisions. See scanned paper note for LASER parameters.     The patient was then moved to the operative suite in stable condition where the eye was prepped and draped in the usual sterile fashion.  A lid speculum was applied. A super sharp blade was used to create a paracentesis, through which 1% preservative free Lidocaine was injected.  Visoelastic was then used to inflate the anterior chamber.  A biplanar incision at the temporal limbus was created with a 2.4 mm keratome or if the main wound was made with the Femtosecond LASER it was bluntly dissected.  A continuous curvilinear capsulorrhexis was started with a cystitome and completed using Utrata forceps.  The lens was hydrodissected and hydro delineated using BSS on a cannula.  The lens nucleus was removed using phacoemulsification.  Remaining cortex was removed using irrigation and aspiration.  Viscoelastic was injected to inflate the capsular bag and a 9.5 D ZXR00 (Symfony) IOL (presbyopia correcting) was inserted into the capsular bag without difficulty.  The lens was easily rotated using a Sinskey hook and good  centration was noted.  Residual viscoelastic and provisc material was removed with irrigation and aspiration.  BSS was used to hydrate the corneal incision and paracentesis sites which were checked and noted to be watertight.  Tobradex ointment was applied to the eye and a clear plastic shield was placed.  The patient tolerated the procedure well and left the operative suite in stable condition.    Clemente Sykes MD

## 2017-09-21 NOTE — ANESTHESIA PREPROCEDURE EVALUATION
Anesthesia Evaluation     . Pt has had prior anesthetic.     No history of anesthetic complications          ROS/MED HX    ENT/Pulmonary:      (-) sleep apnea   Neurologic:       Cardiovascular:     (+) hypertension----. : . . . :. .       METS/Exercise Tolerance:     Hematologic:         Musculoskeletal:         GI/Hepatic:        (-) GERD   Renal/Genitourinary:         Endo:     (+) Obesity, .      Psychiatric:     (+) psychiatric history depression      Infectious Disease:         Malignancy:         Other:                     Physical Exam  Normal systems: dental    Airway   Mallampati: II  TM distance: >3 FB  Neck ROM: full    Dental     Cardiovascular   Rhythm and rate: regular and normal      Pulmonary    breath sounds clear to auscultation                    Anesthesia Plan      History & Physical Review  History and physical reviewed and following examination; no interval change.    ASA Status:  2 .    NPO Status:  > 8 hours    Plan for MAC with Intravenous induction. Reason for MAC:  Procedure to face, neck, head or breast  PONV prophylaxis:  Ondansetron (or other 5HT-3)       Postoperative Care      Consents  Anesthetic plan, risks, benefits and alternatives discussed with:  Patient..                          .

## 2017-09-21 NOTE — IP AVS SNAPSHOT
MRN:[id number]                      After Visit Summary   9/21/2017    Nayeli Byrd    MRN: [id number]           Thank you!     Thank you for choosing [facility] for your care. Our goal is always to provide you with excellent care. Hearing back from our patients is one way we can continue to improve our services. Please take a few minutes to complete the written survey that you may receive in the mail after you visit with us. Thank you!        Patient Information     Date Of Birth          1955        About your hospital stay     You were admitted on:  September 21, 2017 You last received care in the:  [facility]    You were discharged on:  September 21, 2017       Who to Call     For medical emergencies, please call 911.  For non-urgent questions about your medical care, please call your primary care provider or clinic, [phone]  For questions related to your surgery, please call your surgery clinic        Attending Provider     Provider Clemente Pedroza MD Ophthalmology       Primary Care Provider Office Phone # Fax #    [place] [phone]      Your next 10 appointments already scheduled     Oct 03, 2017 10:00 AM CDT   MA SCREENING DIGITAL BILATERAL with OXMA1   [facility] ([facility])    [address] [place] [address] [phone]           Do not use any powder, lotion or deodorant under your arms or on your breast. If you do, we will ask you to remove it before your exam.  Wear comfortable, two-piece clothing.  If you have any allergies, tell your care team.  Bring any previous mammograms from other facilities or have them mailed to the breast center. Three-dimensional (3D) mammograms are available at [facility] locations in [facility], [place], [place], [facility], [facility], and [place]. [facility] locations include [place] and  [facility] in [place]. Benefits of 3D mammograms include: - Improved rate of cancer detection - Decreases your chance of having to go back for more tests, which means fewer: - \"False-positive\" results (This means that there is an abnormal area but it isn't cancer.) - Invasive testing procedures, such as a biopsy or surgery - Can provide clearer images of the breast if you have dense breast tissue. 3D mammography is an optional exam that anyone can have with a 2D mammogram. It doesn't replace or take the place of a 2D mammogram. 2D mammograms remain an effective screening test for all women.  Not all insurance companies cover the cost of a 3D mammogram. Check with your insurance.            Oct 31, 2017  1:30 PM CDT   Office Visit with Nando Ray MD   [facility] ([facility])    [address] [address] [phone]           Bring a current list of meds and any records pertaining to this visit. For Physicals, please bring immunization records and any forms needing to be filled out. Please arrive 10 minutes early to complete paperwork.              Further instructions from your care team       Cataract Surgery Postoperative Instructions  Dr. Clemente Sykes      Postoperative Medications: After surgery, you will use eye drop medications. In most cases, you will start these eye drops 2 days before the day of surgery.   Follow the directions provided to you from the pharmacy or from the doctor's office.    The drops might sting a little when they are instilled, and that is normal.    It doesn't matter what order you put the drops into your eyes, but you should wait at least one minute between drops.    Recently we started using a compounded drop that contains all three prescriptions in one bottle. If you have this drop you only need to use one drop 4 time per day. Many people choose to do the drops at breakfast, lunch, " "dinner, and bedtime.    Artificial Tears- Are lubricating drops used to moisturize the eye.  You can use these as much as you want.  Particularly if your eyes feel watery, gritty, or uncomfortable.  Chilling these drops in the refrigerator results in a more soothing feeling.  There are several brands of artificial tears available including, but not limited to, Optive, Refresh, Systane, Blink, Genteal, Soothe, and others.  You should not use drops that are \"gets the red out.\"  You do not need a prescription for these medications.     Please continue any glaucoma, dry eye, or other medications you were using prior to the surgery.      Please allow 24 to 48 hours when requesting refills, and call BEFORE you run out of drops.    Restriction on Activities-  It is extremely important that you DO NOT RUB THE TREATED EYE.    - You will be given a clear plastic shield to wear as protection over your eye the night after surgery.    - Refrain from many activities that may put your eye at risk of injury, as well as areas containing a high volume of chemicals, dust, and debris.    - Do Not wear any eye makeup or moisturizer around the eye for 1 week after surgery.    - Do Not swim or go into a hot-tub, jacuzzi, or sauna for 1 week following surgery.  You can take showers as normal, but avoid getting shampoo or soap into your eyes.     - Avoid strenuous activity, including lifting more than 10 pounds, for 1 week after surgery.       - It is fine to bathe, read, watch TV, and use the computer.    Symptoms requiring medical attention:     - Sudden onset of increased discharge from the eye.  - Persistent or increasing pain in the eye.  - Sudden decrease in vision.  - Persistent nausea or vomiting.      If you have any questions or concerns before or after your surgery, please contact Dr. Sykes's office at [phone].      [facility] Anesthesia [facility] Care Center Discharge  Instructions  Anesthesia (Eye Care Center) " "  Adult Discharge Instructions    For 24 hours after surgery    1. Get plenty of rest.  Make arrangements to have a responsible adult stay with you for at least 6 hours after you leave the hospital.  2. Do not drive or use heavy equipment for 24 hours.    3. Do not drink alcohol for 24 hours.  4. Do not sign legal documents or make important decisions for 24 hours.  5. Avoid strenuous or risky activities. You may feel lightheaded.  If so, sit for a few minutes before standing.  Have someone help you get up.   6. Conscious sedation patients may resume a regular diet..  Any questions of medical nature, call your physician.Cataract Surgery Postoperative Instructions  Dr. Clemente Sykes      Postoperative Medications: After surgery, you will use eye drop medications. In most cases, you will start these eye drops 2 days before the day of surgery.   Follow the directions provided to you from the pharmacy or from the doctor's office.    The drops might sting a little when they are instilled, and that is normal.    It doesn't matter what order you put the drops into your eyes, but you should wait at least one minute between drops.    Recently we started using a compounded drop that contains all three prescriptions in one bottle. If you have this drop you only need to use one drop 4 time per day. Many people choose to do the drops at breakfast, lunch, dinner, and bedtime.    Artificial Tears- Are lubricating drops used to moisturize the eye.  You can use these as much as you want.  Particularly if your eyes feel watery, gritty, or uncomfortable.  Chilling these drops in the refrigerator results in a more soothing feeling.  There are several brands of artificial tears available including, but not limited to, Optive, Refresh, Systane, Blink, Genteal, Soothe, and others.  You should not use drops that are \"gets the red out.\"  You do not need a prescription for these medications.     Please continue any glaucoma, dry eye, or " other medications you were using prior to the surgery.      Please allow 24 to 48 hours when requesting refills, and call BEFORE you run out of drops.    Restriction on Activities-  It is extremely important that you DO NOT RUB THE TREATED EYE.    - You will be given a clear plastic shield to wear as protection over your eye the night after surgery.    - Refrain from many activities that may put your eye at risk of injury, as well as areas containing a high volume of chemicals, dust, and debris.    - Do Not wear any eye makeup or moisturizer around the eye for 1 week after surgery.    - Do Not swim or go into a hot-tub, jacuzzi, or sauna for 1 week following surgery.  You can take showers as normal, but avoid getting shampoo or soap into your eyes.     - Avoid strenuous activity, including lifting more than 10 pounds, for 1 week after surgery.       - It is fine to bathe, read, watch TV, and use the computer.    Symptoms requiring medical attention:     - Sudden onset of increased discharge from the eye.  - Persistent or increasing pain in the eye.  - Sudden decrease in vision.  - Persistent nausea or vomiting.      If you have any questions or concerns before or after your surgery, please contact Dr. Sykes's office at [phone].         Revised 3/27/2017Cataract Surgery Postoperative Instructions  Dr. Clemente Sykes      Postoperative Medications: After surgery, you will use eye drop medications. In most cases, you will start these eye drops 2 days before the day of surgery.   Follow the directions provided to you from the pharmacy or from the doctor's office.    The drops might sting a little when they are instilled, and that is normal.    It doesn't matter what order you put the drops into your eyes, but you should wait at least one minute between drops.    Recently we started using a compounded drop that contains all three prescriptions in one bottle. If you have this drop you only need to use one  "drop 4 time per day. Many people choose to do the drops at breakfast, lunch, dinner, and bedtime.    Artificial Tears- Are lubricating drops used to moisturize the eye.  You can use these as much as you want.  Particularly if your eyes feel watery, gritty, or uncomfortable.  Chilling these drops in the refrigerator results in a more soothing feeling.  There are several brands of artificial tears available including, but not limited to, Optive, Refresh, Systane, Blink, Genteal, Soothe, and others.  You should not use drops that are \"gets the red out.\"  You do not need a prescription for these medications.     Please continue any glaucoma, dry eye, or other medications you were using prior to the surgery.      Please allow 24 to 48 hours when requesting refills, and call BEFORE you run out of drops.    Restriction on Activities-  It is extremely important that you DO NOT RUB THE TREATED EYE.    - You will be given a clear plastic shield to wear as protection over your eye the night after surgery.    - Refrain from many activities that may put your eye at risk of injury, as well as areas containing a high volume of chemicals, dust, and debris.    - Do Not wear any eye makeup or moisturizer around the eye for 1 week after surgery.    - Do Not swim or go into a hot-tub, jacuzzi, or sauna for 1 week following surgery.  You can take showers as normal, but avoid getting shampoo or soap into your eyes.     - Avoid strenuous activity, including lifting more than 10 pounds, for 1 week after surgery.       - It is fine to bathe, read, watch TV, and use the computer.    Symptoms requiring medical attention:     - Sudden onset of increased discharge from the eye.  - Persistent or increasing pain in the eye.  - Sudden decrease in vision.  - Persistent nausea or vomiting.      If you have any questions or concerns before or after your surgery, please contact Dr. Sykes's office at [phone]. 7. Revised 3/27/2017   " "Cataract Surgery Postoperative Instructions  Dr. Clemente Sykes      Postoperative Medications: After surgery, you will use eye drop medications. In most cases, you will start these eye drops 2 days before the day of surgery.   Follow the directions provided to you from the pharmacy or from the doctor's office.    The drops might sting a little when they are instilled, and that is normal.    It doesn't matter what order you put the drops into your eyes, but you should wait at least one minute between drops.    Recently we started using a compounded drop that contains all three prescriptions in one bottle. If you have this drop you only need to use one drop 4 time per day. Many people choose to do the drops at breakfast, lunch, dinner, and bedtime.    Artificial Tears- Are lubricating drops used to moisturize the eye.  You can use these as much as you want.  Particularly if your eyes feel watery, gritty, or uncomfortable.  Chilling these drops in the refrigerator results in a more soothing feeling.  There are several brands of artificial tears available including, but not limited to, Optive, Refresh, Systane, Blink, Genteal, Soothe, and others.  You should not use drops that are \"gets the red out.\"  You do not need a prescription for these medications.     Please continue any glaucoma, dry eye, or other medications you were using prior to the surgery.      Please allow 24 to 48 hours when requesting refills, and call BEFORE you run out of drops.    Restriction on Activities-  It is extremely important that you DO NOT RUB THE TREATED EYE.    - You will be given a clear plastic shield to wear as protection over your eye the night after surgery.    - Refrain from many activities that may put your eye at risk of injury, as well as areas containing a high volume of chemicals, dust, and debris.    - Do Not wear any eye makeup or moisturizer around the eye for 1 week after surgery.    - Do Not swim or go into a hot-tub, " "jacuzzi, or sauna for 1 week following surgery.  You can take showers as normal, but avoid getting shampoo or soap into your eyes.     - Avoid strenuous activity, including lifting more than 10 pounds, for 1 week after surgery.       - It is fine to bathe, read, watch TV, and use the computer.    Symptoms requiring medical attention:     - Sudden onset of increased discharge from the eye.  - Persistent or increasing pain in the eye.  - Sudden decrease in vision.  - Persistent nausea or vomiting.      If you have any questions or concerns before or after your surgery, please contact Dr. Sykes's office at [phone].         Revised 3/27/2017Cataract Surgery Postoperative Instructions  Dr. Clemente Sykes      Postoperative Medications: After surgery, you will use eye drop medications. In most cases, you will start these eye drops 2 days before the day of surgery.   Follow the directions provided to you from the pharmacy or from the doctor's office.    The drops might sting a little when they are instilled, and that is normal.    It doesn't matter what order you put the drops into your eyes, but you should wait at least one minute between drops.    Recently we started using a compounded drop that contains all three prescriptions in one bottle. If you have this drop you only need to use one drop 4 time per day. Many people choose to do the drops at breakfast, lunch, dinner, and bedtime.    Artificial Tears- Are lubricating drops used to moisturize the eye.  You can use these as much as you want.  Particularly if your eyes feel watery, gritty, or uncomfortable.  Chilling these drops in the refrigerator results in a more soothing feeling.  There are several brands of artificial tears available including, but not limited to, Optive, Refresh, Systane, Blink, Genteal, Soothe, and others.  You should not use drops that are \"gets the red out.\"  You do not need a prescription for these medications.     Please " continue any glaucoma, dry eye, or other medications you were using prior to the surgery.      Please allow 24 to 48 hours when requesting refills, and call BEFORE you run out of drops.    Restriction on Activities-  It is extremely important that you DO NOT RUB THE TREATED EYE.    - You will be given a clear plastic shield to wear as protection over your eye the night after surgery.    - Refrain from many activities that may put your eye at risk of injury, as well as areas containing a high volume of chemicals, dust, and debris.    - Do Not wear any eye makeup or moisturizer around the eye for 1 week after surgery.    - Do Not swim or go into a hot-tub, jacuzzi, or sauna for 1 week following surgery.  You can take showers as normal, but avoid getting shampoo or soap into your eyes.     - Avoid strenuous activity, including lifting more than 10 pounds, for 1 week after surgery.       - It is fine to bathe, read, watch TV, and use the computer.    Symptoms requiring medical attention:     - Sudden onset of increased discharge from the eye.  - Persistent or increasing pain in the eye.  - Sudden decrease in vision.  - Persistent nausea or vomiting.      If you have any questions or concerns before or after your surgery, please contact Dr. Sykes's office at [phone].         Revised 3/27/2017    Revised 3/27/2017[facility] Anesthesia Eye Care Center Discharge  Instructions  Anesthesia (Eye Care Center)   Adult Discharge Instructions    For 24 hours after surgery    8. Get plenty of rest.  Make arrangements to have a responsible adult stay with you for at least 6 hours after you leave the hospital.  9. Do not drive or use heavy equipment for 24 hours.    10. Do not drink alcohol for 24 hours.  11. Do not sign legal documents or make important decisions for 24 hours.  12. Avoid strenuous or risky activities. You may feel lightheaded.  If so, sit for a few minutes before standing.  Have someone help you  "get up.   13. Conscious sedation patients may resume a regular diet..  14. Any questions of medical nature, call your physician.    Cataract Surgery Postoperative Instructions  Dr. Clemente Sykes      Postoperative Medications: After surgery, you will use eye drop medications. In most cases, you will start these eye drops 2 days before the day of surgery.   Follow the directions provided to you from the pharmacy or from the doctor's office.    The drops might sting a little when they are instilled, and that is normal.    It doesn't matter what order you put the drops into your eyes, but you should wait at least one minute between drops.    Recently we started using a compounded drop that contains all three prescriptions in one bottle. If you have this drop you only need to use one drop 4 time per day. Many people choose to do the drops at breakfast, lunch, dinner, and bedtime.    Artificial Tears- Are lubricating drops used to moisturize the eye.  You can use these as much as you want.  Particularly if your eyes feel watery, gritty, or uncomfortable.  Chilling these drops in the refrigerator results in a more soothing feeling.  There are several brands of artificial tears available including, but not limited to, Optive, Refresh, Systane, Blink, Genteal, Soothe, and others.  You should not use drops that are \"gets the red out.\"  You do not need a prescription for these medications.     Please continue any glaucoma, dry eye, or other medications you were using prior to the surgery.      Please allow 24 to 48 hours when requesting refills, and call BEFORE you run out of drops.    Restriction on Activities-  It is extremely important that you DO NOT RUB THE TREATED EYE.    - You will be given a clear plastic shield to wear as protection over your eye the night after surgery.    - Refrain from many activities that may put your eye at risk of injury, as well as areas containing a high volume of chemicals, dust, and " debris.    - Do Not wear any eye makeup or moisturizer around the eye for 1 week after surgery.    - Do Not swim or go into a hot-tub, jacuzzi, or sauna for 1 week following surgery.  You can take showers as normal, but avoid getting shampoo or soap into your eyes.     - Avoid strenuous activity, including lifting more than 10 pounds, for 1 week after surgery.       - It is fine to bathe, read, watch TV, and use the computer.    Symptoms requiring medical attention:     - Sudden onset of increased discharge from the eye.  - Persistent or increasing pain in the eye.  - Sudden decrease in vision.  - Persistent nausea or vomiting.      If you have any questions or concerns before or after your surgery, please contact Dr. Sykes's office at [phone].         Revised 3/27/2017    Pending Results     No orders found from 9/19/2017 to 9/22/2017.            Admission Information     Date & Time Provider Department Dept. Phone    9/21/2017 Clemente Sykes MD [facility] [phone]      Your Vitals Were     Blood Pressure Pulse Temperature Respirations Last Period Pulse Oximetry    134/82 59 97.3  F (36.3  C) (Temporal) 16 02/25/2004 97%      MyChart Information     [facility] gives you secure access to your electronic health record. If you see a primary care provider, you can also send messages to your care team and make appointments. If you have questions, please call your primary care clinic.  If you do not have a primary care provider, please call [phone] and they will assist you.        Care EveryWhere ID     This is your Care EveryWhere ID. This could be used by other organizations to access your [facility] medical records  [id number]        Equal Access to Services     CHASTITY CROSS : Lorenza Rizzo, wilma simental, shanda chavez. So [facility] [phone].    ATENCIÓN: Si habla español, tiene a morrow disposición  servicios gratuitos de asistencia lingüística. Carlos Enrique cummins [phone].    We comply with applicable federal civil rights laws and [place] laws. We do not discriminate on the basis of race, color, national origin, age, disability sex, sexual orientation or gender identity.               Review of your medicines      Notice     You have not been prescribed any medications.             Protect others around you: Learn how to safely use, store and throw away your medicines at www.disposemymeds.org.             Medication List: This is a list of all your medications and when to take them. Check marks below indicate your daily home schedule. Keep this list as a reference.      Notice     You have not been prescribed any medications.

## 2017-09-21 NOTE — ANESTHESIA CARE TRANSFER NOTE
Patient: Nayeli Byrd    Procedure(s):  LEFT EYE  FEMTOSECOND LASER ASSISTED PHACOEMULSIFICATION CLEAR CORNEA WITH DELUXE INTRAOCULAR LENS IMPLANT  - Wound Class: I-Clean    Diagnosis: CATARACT   Diagnosis Additional Information: No value filed.    Anesthesia Type:   MAC     Note:  Airway :Room Air  Patient transferred to:PACU        Vitals: (Last set prior to Anesthesia Care Transfer)    CRNA VITALS  9/21/2017 0725 - 9/21/2017 0759      9/21/2017             Resp Rate (set): 10                Electronically Signed By: PARRIS Dickerson CRNA  September 21, 2017  7:59 AM

## 2017-10-03 ENCOUNTER — RADIANT APPOINTMENT (OUTPATIENT)
Dept: MAMMOGRAPHY | Facility: CLINIC | Age: 62
End: 2017-10-03
Attending: INTERNAL MEDICINE
Payer: COMMERCIAL

## 2017-10-03 DIAGNOSIS — Z12.31 VISIT FOR SCREENING MAMMOGRAM: ICD-10-CM

## 2017-10-03 PROCEDURE — G0202 SCR MAMMO BI INCL CAD: HCPCS | Mod: TC

## 2018-03-13 ENCOUNTER — TELEPHONE (OUTPATIENT)
Dept: INTERNAL MEDICINE | Facility: CLINIC | Age: 63
End: 2018-03-13

## 2018-03-13 NOTE — TELEPHONE ENCOUNTER
Per outreach, patient is aware of overdue colonoscopy and PAP screening and will call on their own time for scheduling.     Thanks

## 2018-03-15 ENCOUNTER — OFFICE VISIT (OUTPATIENT)
Dept: OBGYN | Facility: CLINIC | Age: 63
End: 2018-03-15
Payer: COMMERCIAL

## 2018-03-15 VITALS — DIASTOLIC BLOOD PRESSURE: 82 MMHG | SYSTOLIC BLOOD PRESSURE: 110 MMHG | BODY MASS INDEX: 34.57 KG/M2 | WEIGHT: 189 LBS

## 2018-03-15 DIAGNOSIS — Z12.4 SCREENING FOR MALIGNANT NEOPLASM OF CERVIX: Primary | ICD-10-CM

## 2018-03-15 DIAGNOSIS — Z12.11 SPECIAL SCREENING FOR MALIGNANT NEOPLASMS, COLON: ICD-10-CM

## 2018-03-15 DIAGNOSIS — Z00.00 ROUTINE HISTORY AND PHYSICAL EXAMINATION OF ADULT: ICD-10-CM

## 2018-03-15 PROCEDURE — G0145 SCR C/V CYTO,THINLAYER,RESCR: HCPCS | Performed by: OBSTETRICS & GYNECOLOGY

## 2018-03-15 PROCEDURE — 99396 PREV VISIT EST AGE 40-64: CPT | Performed by: OBSTETRICS & GYNECOLOGY

## 2018-03-15 PROCEDURE — 87624 HPV HI-RISK TYP POOLED RSLT: CPT | Performed by: OBSTETRICS & GYNECOLOGY

## 2018-03-15 PROCEDURE — G0124 SCREEN C/V THIN LAYER BY MD: HCPCS | Performed by: OBSTETRICS & GYNECOLOGY

## 2018-03-15 NOTE — NURSING NOTE
"Chief Complaint   Patient presents with     Gyn Exam       Initial /82  Wt 189 lb (85.7 kg)  LMP 2004  BMI 34.57 kg/m2 Estimated body mass index is 34.57 kg/(m^2) as calculated from the following:    Height as of 17: 5' 2\" (1.575 m).    Weight as of this encounter: 189 lb (85.7 kg).  BP completed using cuff size: regular        The following HM Due: pap smear      The following patient reported/Care Every where data was sent to:  P ABSTRACT QUALITY INITIATIVES [[id number]]        orders have been placed    Jasmine Urena CMA               "

## 2018-03-15 NOTE — MR AVS SNAPSHOT
After Visit Summary   3/15/2018    Nayeli Byrd    MRN: [id number]           Patient Information     Date Of Birth          1955        Visit Information        Provider Department      3/15/2018 1:30 PM Nando Ray MD [facility]        Today's Diagnoses     Screening for malignant neoplasm of cervix    -  1    Routine history and physical examination of adult        Special screening for malignant neoplasms, colon           Follow-ups after your visit        Additional Services     GASTROENTEROLOGY ADULT REF PROCEDURE ONLY       Last Lab Result: Creatinine (mg/dL)       Date                     Value                 09/13/2017               0.94             ----------  Body mass index is 34.57 kg/(m^2).     Needed:  No  Language:  English    Patient will be contacted to schedule procedure.     Please be aware that coverage of these services is subject to the terms and limitations of your health insurance plan.  Call member services at your health plan with any benefit or coverage questions.  Any procedures must be performed at a [facility] facility OR coordinated by your clinic's referral office.    Please bring the following with you to your appointment:    (1) Any X-Rays, CTs or MRIs which have been performed.  Contact the facility where they were done to arrange for  prior to your scheduled appointment.    (2) List of current medications   (3) This referral request   (4) Any documents/labs given to you for this referral                  Who to contact     If you have questions or need follow up information about today's clinic visit or your schedule please contact [facility] directly at [phone].  Normal or non-critical lab and imaging results will be communicated to you by MyChart, letter or phone within 4 business days after the clinic has received the results. If you do not hear from us within 7 days, please  contact the clinic through [employer] or phone. If you have a critical or abnormal lab result, we will notify you by phone as soon as possible.  Submit refill requests through [employer] or call your pharmacy and they will forward the refill request to us. Please allow 3 business days for your refill to be completed.          Additional Information About Your Visit        [employer] Information     [employer] gives you secure access to your electronic health record. If you see a primary care provider, you can also send messages to your care team and make appointments. If you have questions, please call your primary care clinic.  If you do not have a primary care provider, please call [phone] and they will assist you.        Care EveryWhere ID     This is your Care EveryWhere ID. This could be used by other organizations to access your [place] medical records  [id number]        Your Vitals Were     Last Period BMI (Body Mass Index)                02/25/2004 34.57 kg/m2           Blood Pressure from Last 3 Encounters:   03/15/18 110/82   09/21/17 134/82   09/07/17 (!) 146/103    Weight from Last 3 Encounters:   03/15/18 189 lb (85.7 kg)   09/07/17 170 lb (77.1 kg)   08/31/17 190 lb (86.2 kg)              We Performed the Following     GASTROENTEROLOGY ADULT REF PROCEDURE ONLY     HPV High Risk Types DNA Cervical     Pap imaged thin layer screen with HPV - recommended age 30 - 65 years (select HPV order below)        Primary Care Provider Office Phone # Fax #    Wing Olivia [phone]       [address] [place] [id number]        Equal Access to Services     CHASTITY CROSS : Hadii chandrakant rappo Sosayra, waaxda luqadaha, qaybta kaalmada alfredito, shanda boyd. So [facility] [phone].    ATENCIÓN: Si habla español, tiene a morrow disposición servicios gratuitos de asistencia lingüística. Llame al [phone].    We comply with applicable federal civil rights laws and [place]  laws. We do not discriminate on the basis of race, color, national origin, age, disability, sex, sexual orientation, or gender identity.            Thank you!     Thank you for choosing [facility]  for your care. Our goal is always to provide you with excellent care. Hearing back from our patients is one way we can continue to improve our services. Please take a few minutes to complete the written survey that you may receive in the mail after your visit with us. Thank you!             Your Updated Medication List - Protect others around you: Learn how to safely use, store and throw away your medicines at www.disposemymeds.org.      Notice  As of 3/15/2018  4:56 PM    You have not been prescribed any medications.

## 2018-03-15 NOTE — PROGRESS NOTES
SUBJECTIVE:  Nayeli Byrd is a 62 year old,  female,  P1  who presents for annual exam. Patient's last menstrual period was 2004.       History of abnormal Pap smear: No  Regular self breast exam: No  Family history of breast cancer: No. Colon cancer No. Ovarian cancer No.  History of abnormal lipids: No      Past Medical History:   Diagnosis Date     Colonic polyp      Contact dermatitis and other eczema, due to unspecified cause      Depressive disorder, not elsewhere classified      Fracture, humerus closed 2013    Closed left humeral head fracture.     Hyperlipidemia LDL goal <130       Morbid obesity (H)      Non morbid obesity, unspecified obesity type 2017     Unspecified essential hypertension        Past Surgical History:   Procedure Laterality Date     C/SECTION, LOW TRANSVERSE      , Low Transverse     COLONOSCOPY       EYE SURGERY       KERATOTOMY ARCUATE WITH FEMTOSECOND LASER/IMAGING FOR ATIOL Right 2017    Procedure: KERATOTOMY ARCUATE WITH FEMTOSECOND LASER/IMAGING FOR ATIOL;  RIGHT EYE FEMTOSECOND LASER CAPSULOTOMY ; LENS FRAGMENTATION ; ARCUATE INCISIONS ;  Surgeon: Clemente Sykes MD;  Location: [facility]     KERATOTOMY ARCUATE WITH FEMTOSECOND LASER/IMAGING FOR ATIOL Left 2017    Procedure: KERATOTOMY ARCUATE WITH FEMTOSECOND LASER/IMAGING FOR ATIOL;  LEFT  EYE FEMTOSECOND LASER CAPSULOTOMY, LENS FRAGMENTATION,  ARCUATE INCISIONS ;  Surgeon: lCemente Sykes MD;  Location: [facility]     ORTHOPEDIC SURGERY      right foot surgery     PHACOEMULSIFICATION CLEAR CORNEA WITH DELUXE INTRAOCULAR LENS IMPLANT Right 2017    Procedure: PHACOEMULSIFICATION CLEAR CORNEA WITH DELUXE INTRAOCULAR LENS IMPLANT;  RIGHT EYE PHACOEMULSIFICATION CLEAR CORNEA WITH DELUXE INTRAOCULAR LENS IMPLANT ;  Surgeon: Clemente Sykes MD;  Location: [facility]     PHACOEMULSIFICATION CLEAR CORNEA WITH DELUXE INTRAOCULAR LENS IMPLANT Left 2017    Procedure:  PHACOEMULSIFICATION CLEAR CORNEA WITH DELUXE INTRAOCULAR LENS IMPLANT;  LEFT EYE  FEMTOSECOND LASER ASSISTED PHACOEMULSIFICATION CLEAR CORNEA WITH DELUXE INTRAOCULAR LENS IMPLANT ;  Surgeon: Clemente Sykes MD;  Location: [facility]       No current outpatient prescriptions on file.     No current facility-administered medications for this visit.      Allergies   Allergen Reactions     Hctz      Renal insufficiency       Social History   Substance Use Topics     Smoking status: Never Smoker     Smokeless tobacco: Former User     Alcohol use Yes      Comment: 2 per day       Review of Systems    CONSTITUTIONAL:NEGATIVE  EYES: NEGATIVE  ENT/MOUTH: NEGATIVE  RESP: NEGATIVE  CV: NEGATIVE  GI: NEGATIVE  : NEGATIVE  MUSCULOSKELATAL: NEGATIVE  INTEGUMENTARY/SKIN: NEGATIVE  BREAST: NEGATIVE  NEURO: NEGATIVE.      OBJECTIVE:  /82  Wt 189 lb (85.7 kg)  LMP 02/25/2004  BMI 34.57 kg/m2  General appearance: Healthy.  Skin: Normal.  Mental Status: cooperative, normal affect, no gross thought process defects.  Thyroid: Normal to palpation, no enlargement or nodules noted.  Breasts: Symmetric without mass tenderness or discharge.  Axillary nodes negative.  Lungs: Clear to auscultation.   Heart.: Normal rate and rhythm.  No murmurs, clicks or gallops.   Abdomen: BS active. Soft, non-tender, no masses or organomegaly.    Pelvis: normal external genitalia, normal groin lymphatics, normal urethral meatus, normal vaginal mucosa, normal cervix, normal adnexa, no masses or tenderness, uterus normal size and shape and uterus antiverted.   Extremities: Normal     ASSESSMENT:  Satisfactory annual gyn exam    PLAN:    1) Pap smear  2) Mammography, lipids at appropriate intervals        PE: reviewed health maintenance including diet, regular exercise and periodic exams.

## 2018-03-21 LAB
COPATH REPORT: NORMAL
PAP: NORMAL

## 2018-03-22 LAB
FINAL DIAGNOSIS: NORMAL
HPV HR 12 DNA CVX QL NAA+PROBE: NEGATIVE
HPV16 DNA SPEC QL NAA+PROBE: NEGATIVE
HPV18 DNA SPEC QL NAA+PROBE: NEGATIVE
SPECIMEN DESCRIPTION: NORMAL
SPECIMEN SOURCE CVX/VAG CYTO: NORMAL

## 2018-03-30 ENCOUNTER — TELEPHONE (OUTPATIENT)
Dept: OBGYN | Facility: CLINIC | Age: 63
End: 2018-03-30

## 2018-03-30 DIAGNOSIS — R87.619 ENDOMETRIAL CELLS ON CERVICAL PAP SMEAR INCONSISTENT WITH LAST MENSTRUAL PERIOD: Primary | ICD-10-CM

## 2018-03-30 NOTE — TELEPHONE ENCOUNTER
"Pap result notes copied and pasted into tele encounter - 2nd request for provider review:    \"Dr. Ray,    Please review and advise on follow up.  62 year old with current NIL pap with endometrial cells present, neg HR HPV.  No abnormal pap hx noted upon review of chart.     Please advise on follow up given endometrial cells in postmenopausal patient.\"    Thanks!  JADON QuinterosN, RN - Pap Tracking    "

## 2018-04-05 NOTE — TELEPHONE ENCOUNTER
Please notify patient. She needs pelvic ultrasound to assess the endometrial cavity, endometrial biopsy.

## 2018-04-06 PROBLEM — R87.619 ENDOMETRIAL CELLS ON CERVICAL PAP SMEAR INCONSISTENT WITH LAST MENSTRUAL PERIOD: Status: ACTIVE | Noted: 2018-03-15

## 2018-04-10 ENCOUNTER — RADIANT APPOINTMENT (OUTPATIENT)
Dept: ULTRASOUND IMAGING | Facility: CLINIC | Age: 63
End: 2018-04-10
Attending: OBSTETRICS & GYNECOLOGY
Payer: COMMERCIAL

## 2018-04-10 DIAGNOSIS — R87.619 ENDOMETRIAL CELLS ON CERVICAL PAP SMEAR INCONSISTENT WITH LAST MENSTRUAL PERIOD: ICD-10-CM

## 2018-04-10 PROCEDURE — 76856 US EXAM PELVIC COMPLETE: CPT | Performed by: FAMILY MEDICINE

## 2018-04-10 PROCEDURE — 76830 TRANSVAGINAL US NON-OB: CPT | Performed by: FAMILY MEDICINE

## 2018-04-25 ENCOUNTER — OFFICE VISIT (OUTPATIENT)
Dept: OBGYN | Facility: CLINIC | Age: 63
End: 2018-04-25
Payer: COMMERCIAL

## 2018-04-25 VITALS
SYSTOLIC BLOOD PRESSURE: 116 MMHG | BODY MASS INDEX: 34.42 KG/M2 | DIASTOLIC BLOOD PRESSURE: 82 MMHG | WEIGHT: 188.2 LBS | HEART RATE: 66 BPM

## 2018-04-25 DIAGNOSIS — N84.0 ENDOMETRIAL POLYP: ICD-10-CM

## 2018-04-25 DIAGNOSIS — R87.619 ENDOMETRIAL CELLS ON CERVICAL PAP SMEAR INCONSISTENT WITH LAST MENSTRUAL PERIOD: Primary | ICD-10-CM

## 2018-04-25 PROCEDURE — 99214 OFFICE O/P EST MOD 30 MIN: CPT | Performed by: OBSTETRICS & GYNECOLOGY

## 2018-04-25 NOTE — PROGRESS NOTES
SUBJECTIVE:   CC: Endometrial cells on pap smear                                               Nayeli Byrd is a 62 year old  female who presents to clinic today for US results and  follow up for endometrial cells on pap smear 3/2018. She does not have any postmenopausal bleeding.     US reviewed with patient:  LMP: post clare 10 yrs    Hormones: none      Measurements:  Uterus:   6.3 x 2.5 x 3.8 cm.     Position is anteverted.  Contour is smth/reg.      Endo cav: 2 mm         Hyperechoic area 0.4 x 0.4 x 0.4 cm   Cervix: wnl          Right ovary:  0.9 x 1.1 x 1.2  cm.   Wnl  Left ovary:  1.1 x 1.3 x 1.4 cm.  Wnl      Cul de sac: no free fluid    Lab Results   Component Value Date    PAP NIL, endometrial cells, negative HPV 03/15/2018    PAP NIL 2009    PAP NIL 2007      Past Medical History:   Diagnosis Date     Colonic polyp      Contact dermatitis and other eczema, due to unspecified cause      Depressive disorder, not elsewhere classified      Fracture, humerus closed 2013    Closed left humeral head fracture.     Hyperlipidemia LDL goal <130       Morbid obesity (H)      Non morbid obesity, unspecified obesity type 2017     Unspecified essential hypertension       Past Surgical History:   Procedure Laterality Date     C/SECTION, LOW TRANSVERSE      , Low Transverse     COLONOSCOPY       EYE SURGERY       KERATOTOMY ARCUATE WITH FEMTOSECOND LASER/IMAGING FOR ATIOL Right 2017    Procedure: KERATOTOMY ARCUATE WITH FEMTOSECOND LASER/IMAGING FOR ATIOL;  RIGHT EYE FEMTOSECOND LASER CAPSULOTOMY ; LENS FRAGMENTATION ; ARCUATE INCISIONS ;  Surgeon: Clemente Sykes MD;  Location: [facility]     KERATOTOMY ARCUATE WITH FEMTOSECOND LASER/IMAGING FOR ATIOL Left 2017    Procedure: KERATOTOMY ARCUATE WITH FEMTOSECOND LASER/IMAGING FOR ATIOL;  LEFT  EYE FEMTOSECOND LASER CAPSULOTOMY, LENS FRAGMENTATION,  ARCUATE INCISIONS ;  Surgeon: Clemente Sykes MD;  Location:  [facility]     ORTHOPEDIC SURGERY      right foot surgery     PHACOEMULSIFICATION CLEAR CORNEA WITH DELUXE INTRAOCULAR LENS IMPLANT Right 2017    Procedure: PHACOEMULSIFICATION CLEAR CORNEA WITH DELUXE INTRAOCULAR LENS IMPLANT;  RIGHT EYE PHACOEMULSIFICATION CLEAR CORNEA WITH DELUXE INTRAOCULAR LENS IMPLANT ;  Surgeon: Clemente Sykes MD;  Location: [facility]     PHACOEMULSIFICATION CLEAR CORNEA WITH DELUXE INTRAOCULAR LENS IMPLANT Left 2017    Procedure: PHACOEMULSIFICATION CLEAR CORNEA WITH DELUXE INTRAOCULAR LENS IMPLANT;  LEFT EYE  FEMTOSECOND LASER ASSISTED PHACOEMULSIFICATION CLEAR CORNEA WITH DELUXE INTRAOCULAR LENS IMPLANT ;  Surgeon: Clemente Sykes MD;  Location: [facility]      Problem list and histories reviewed & adjusted, as indicated.  Additional history: as documented.         No current outpatient prescriptions on file prior to visit.  No current facility-administered medications on file prior to visit.   Allergies   Allergen Reactions     Hctz      Renal insufficiency       OBJECTIVE:   /82 (BP Location: Right arm, Patient Position: Chair, Cuff Size: Adult Large)  Pulse 66  Wt 188 lb 3.2 oz (85.4 kg)  LMP 2004  BMI 34.42 kg/m2   Const: sitting in chair in no acute distress, comfortable  Psych: mood stable, appropriate affect  Neuro: A+Ox3     Further physical exam not indicated, this was a counseling visit only.    ASSESSMENT/PLAN:                                                    Nayeli Byrd is a 62 year old female  here for US follow up for endometrial cells on pap smear. We discussed that while her lining is thin there is a lesion concerning for an endometrial polyp which I recommend removing vs following with repeat US. We discussed increased risk of precancerous lesions in endometrial polyps as well as concern given endometrial cells on pap smear. She would like to proceed with hysteroscopy D&C with morcellation. We discussed risks, benefits, and  alternatives.  - omar-op worksheet done    Total time spent was 25 minutes; greater than 50% of time was spent in counseling and/or coordination of care for the above listed diagnoses.     Geena Jain MD  Obstetrics and Gynecology   [facility]

## 2018-04-25 NOTE — MR AVS SNAPSHOT
After Visit Summary   4/25/2018    Nayeli Byrd    MRN: [id number]           Patient Information     Date Of Birth          1955        Visit Information        Provider Department      4/25/2018 4:00 PM Geena Jain MD [facility]        Today's Diagnoses     Endometrial cells on cervical Pap smear inconsistent with last menstrual period    -  1    Endometrial polyp           Follow-ups after your visit        Your next 10 appointments already scheduled     May 04, 2018   Procedure with Carlo Huitron MD   [facility] ([facility])    [address]  [place] [id number] [phone]           [facility] is located at [address] [place]              Who to contact     If you have questions or need follow up information about today's clinic visit or your schedule please contact [facility] directly at [phone].  Normal or non-critical lab and imaging results will be communicated to you by MyChart, letter or phone within 4 business days after the clinic has received the results. If you do not hear from us within 7 days, please contact the clinic through [employer] or phone. If you have a critical or abnormal lab result, we will notify you by phone as soon as possible.  Submit refill requests through [employer] or call your pharmacy and they will forward the refill request to us. Please allow 3 business days for your refill to be completed.          Additional Information About Your Visit        MyChart Information     [employer] gives you secure access to your electronic health record. If you see a primary care provider, you can also send messages to your care team and make appointments. If you have questions, please call your primary care clinic.  If you do not have a primary care provider, please call [phone] and they will assist you.        Care EveryWhere ID     This is  your Care EveryWhere ID. This could be used by other organizations to access your [place] medical records  [id number]        Your Vitals Were     Pulse Last Period BMI (Body Mass Index)             66 02/25/2004 34.42 kg/m2          Blood Pressure from Last 3 Encounters:   04/25/18 116/82   03/15/18 110/82   09/21/17 134/82    Weight from Last 3 Encounters:   04/25/18 188 lb 3.2 oz (85.4 kg)   03/15/18 189 lb (85.7 kg)   09/07/17 170 lb (77.1 kg)              We Performed the Following     Shana-Operative Worksheet        Primary Care Provider Office Phone # Fax #    Wing Olivia [phone]       [address] [place] [address]        Equal Access to Services     CHASTITY CROSS : Hadii aad ku hadasho Sosayra, waaxda luqadaha, qaybta kaalmada adeegyada, shanda cardoza . So [facility] [phone].    ATENCIÓN: Si habla español, tiene a morrow disposición servicios gratuitos de asistencia lingüística. Carlos Enrique al [phone].    We comply with applicable federal civil rights laws and [place] laws. We do not discriminate on the basis of race, color, national origin, age, disability, sex, sexual orientation, or gender identity.            Thank you!     Thank you for choosing [facility]  for your care. Our goal is always to provide you with excellent care. Hearing back from our patients is one way we can continue to improve our services. Please take a few minutes to complete the written survey that you may receive in the mail after your visit with us. Thank you!             Your Updated Medication List - Protect others around you: Learn how to safely use, store and throw away your medicines at www.disposemymeds.org.      Notice  As of 4/25/2018  4:40 PM    You have not been prescribed any medications.

## 2018-04-25 NOTE — NURSING NOTE
"Chief Complaint   Patient presents with     Biopsy     EMB, pt had endometrial cells found on pap smear-03/15/18       Initial /82 (BP Location: Right arm, Patient Position: Chair, Cuff Size: Adult Large)  Pulse 66  Wt 188 lb 3.2 oz (85.4 kg)  LMP 02/25/2004  BMI 34.42 kg/m2 Estimated body mass index is 34.42 kg/(m^2) as calculated from the following:    Height as of 9/7/17: 5' 2\" (1.575 m).    Weight as of this encounter: 188 lb 3.2 oz (85.4 kg).  Medication Reconciliation: complete     Ronnie Akins CMA      "

## 2018-04-26 ENCOUNTER — TELEPHONE (OUTPATIENT)
Dept: OBGYN | Facility: CLINIC | Age: 63
End: 2018-04-26

## 2018-04-26 NOTE — TELEPHONE ENCOUNTER
Type of surgery: hysteroscopy dilation and curettage, possible morcellation of polyp  Location of surgery: Other: [facility]  Date and time of surgery: 5/16/18 @ 10:00 am  Surgeon: Dr. Jain  Pre-Op Appt Date: 5/9/18  Post-Op Appt Date: 5/30/18   Packet sent out: Yes  Pre-cert/Authorization completed:  No  Date:

## 2018-04-26 NOTE — TELEPHONE ENCOUNTER
Procedure name(s) - multi select hysteroscopy dilation and curettage, possible morcellation of polyp       Reason for procedure abnormal ultrasound findings suspicious for polyp, endometrial cells on pap smear      Surgeon: Geena Jain MD      Is this a multi surgeon case? No      Laterality N/A      Request for additional equipment Other (see comments) None     Anesthesia Local + MAC      Initiate Pre-op orders for above procedure: Yes, as ordered in Epic Additional orders noted there also     Location of Case: [facility]      PA Assistant: No      Operating room  requested: No      Urgency of Surgery: Routine      Surgeon Procedure Time (incision to closure) in minutes (per procedure as applicable) 45      Note:  Surgical Case Time Needed (in minutes)     Patient Class (for admit prior to surgery, specify number of days in comments): Same day (surgery center outpatient)      H&P To Be Completed By: PCP      Post-Op Appointment 2 weeks      Vendor Needed? No      Other/Additional Comments, as needed: morcellator, fluid management

## 2018-05-04 ENCOUNTER — SURGERY (OUTPATIENT)
Age: 63
End: 2018-05-04

## 2018-05-04 ENCOUNTER — APPOINTMENT (OUTPATIENT)
Dept: SURGERY | Facility: PHYSICIAN GROUP | Age: 63
End: 2018-05-04
Payer: COMMERCIAL

## 2018-05-04 ENCOUNTER — HOSPITAL ENCOUNTER (OUTPATIENT)
Facility: CLINIC | Age: 63
Discharge: HOME | End: 2018-05-04
Attending: SURGERY | Admitting: SURGERY
Payer: COMMERCIAL

## 2018-05-04 VITALS
DIASTOLIC BLOOD PRESSURE: 90 MMHG | SYSTOLIC BLOOD PRESSURE: 125 MMHG | OXYGEN SATURATION: 94 % | RESPIRATION RATE: 28 BRPM

## 2018-05-04 LAB — COLONOSCOPY: NORMAL

## 2018-05-04 PROCEDURE — G0105 COLORECTAL SCRN; HI RISK IND: HCPCS | Performed by: SURGERY

## 2018-05-04 PROCEDURE — 99153 MOD SED SAME PHYS/QHP EA: CPT | Performed by: SURGERY

## 2018-05-04 PROCEDURE — 99152 MOD SED SAME PHYS/QHP 5/>YRS: CPT | Performed by: SURGERY

## 2018-05-04 PROCEDURE — 99153 MOD SED SAME PHYS/QHP EA: CPT

## 2018-05-04 PROCEDURE — G0500 MOD SEDAT ENDO SERVICE >5YRS: HCPCS | Performed by: SURGERY

## 2018-05-04 PROCEDURE — 25000128 H RX IP 250 OP 636: Performed by: SURGERY

## 2018-05-04 PROCEDURE — 45378 DIAGNOSTIC COLONOSCOPY: CPT | Performed by: SURGERY

## 2018-05-04 RX ORDER — LIDOCAINE 40 MG/G
CREAM TOPICAL
Status: DISCONTINUED | OUTPATIENT
Start: 2018-05-04 | End: 2018-05-04 | Stop reason: HOSPADM

## 2018-05-04 RX ORDER — ONDANSETRON 2 MG/ML
4 INJECTION INTRAMUSCULAR; INTRAVENOUS
Status: DISCONTINUED | OUTPATIENT
Start: 2018-05-04 | End: 2018-05-04 | Stop reason: HOSPADM

## 2018-05-04 RX ORDER — FENTANYL CITRATE 50 UG/ML
INJECTION, SOLUTION INTRAMUSCULAR; INTRAVENOUS PRN
Status: DISCONTINUED | OUTPATIENT
Start: 2018-05-04 | End: 2018-05-04 | Stop reason: HOSPADM

## 2018-05-04 RX ADMIN — FENTANYL CITRATE 100 MCG: 50 INJECTION, SOLUTION INTRAMUSCULAR; INTRAVENOUS at 11:50

## 2018-05-04 RX ADMIN — MIDAZOLAM 2 MG: 1 INJECTION INTRAMUSCULAR; INTRAVENOUS at 11:54

## 2018-05-09 ENCOUNTER — OFFICE VISIT (OUTPATIENT)
Dept: INTERNAL MEDICINE | Facility: CLINIC | Age: 63
End: 2018-05-09
Payer: COMMERCIAL

## 2018-05-09 VITALS
SYSTOLIC BLOOD PRESSURE: 110 MMHG | HEIGHT: 62 IN | HEART RATE: 68 BPM | WEIGHT: 179.7 LBS | BODY MASS INDEX: 33.07 KG/M2 | DIASTOLIC BLOOD PRESSURE: 80 MMHG | RESPIRATION RATE: 16 BRPM | TEMPERATURE: 98 F

## 2018-05-09 DIAGNOSIS — R87.619 ENDOMETRIAL CELLS ON CERVICAL PAP SMEAR INCONSISTENT WITH LAST MENSTRUAL PERIOD: ICD-10-CM

## 2018-05-09 DIAGNOSIS — N84.0 UTERINE POLYP: ICD-10-CM

## 2018-05-09 DIAGNOSIS — R93.5 ABNORMAL ULTRASOUND OF UTERUS: ICD-10-CM

## 2018-05-09 DIAGNOSIS — Z01.818 PREOP GENERAL PHYSICAL EXAM: Primary | ICD-10-CM

## 2018-05-09 LAB
CREAT SERPL-MCNC: 0.7 MG/DL (ref 0.52–1.04)
GFR SERPL CREATININE-BSD FRML MDRD: 85 ML/MIN/1.7M2
HGB BLD-MCNC: 14 G/DL (ref 11.7–15.7)
PLATELET # BLD AUTO: 194 10E9/L (ref 150–450)

## 2018-05-09 PROCEDURE — 85018 HEMOGLOBIN: CPT | Performed by: PHYSICIAN ASSISTANT

## 2018-05-09 PROCEDURE — 82565 ASSAY OF CREATININE: CPT | Performed by: PHYSICIAN ASSISTANT

## 2018-05-09 PROCEDURE — 85049 AUTOMATED PLATELET COUNT: CPT | Performed by: PHYSICIAN ASSISTANT

## 2018-05-09 PROCEDURE — 36415 COLL VENOUS BLD VENIPUNCTURE: CPT | Performed by: PHYSICIAN ASSISTANT

## 2018-05-09 PROCEDURE — 93000 ELECTROCARDIOGRAM COMPLETE: CPT | Performed by: PHYSICIAN ASSISTANT

## 2018-05-09 PROCEDURE — 99214 OFFICE O/P EST MOD 30 MIN: CPT | Performed by: PHYSICIAN ASSISTANT

## 2018-05-09 ASSESSMENT — PAIN SCALES - GENERAL: PAINLEVEL: NO PAIN (0)

## 2018-05-09 NOTE — MR AVS SNAPSHOT
After Visit Summary   5/9/2018    Nayeli Byrd    MRN: [id number]           Patient Information     Date Of Birth          1955        Visit Information        Provider Department      5/9/2018 1:20 PM Basilia Hopper PA-C [facility]        Today's Diagnoses     Preop general physical exam    -  1    Endometrial cells on cervical Pap smear inconsistent with last menstrual period        Uterine polyp        Abnormal ultrasound of uterus          Care Instructions      Before Your Surgery      Call your surgeon if there is any change in your health. This includes signs of a cold or flu (such as a sore throat, runny nose, cough, rash or fever).    Do not smoke, drink alcohol or take over the counter medicine (unless your surgeon or primary care doctor tells you to) for the 24 hours before and after surgery.    If you take prescribed drugs: Follow your doctor s orders about which medicines to take and which to stop until after surgery.    Eating and drinking prior to surgery: follow the instructions from your surgeon    Take a shower or bath the night before surgery. Use the soap your surgeon gave you to gently clean your skin. If you do not have soap from your surgeon, use your regular soap. Do not shave or scrub the surgery site.  Wear clean pajamas and have clean sheets on your bed.           Follow-ups after your visit        Your next 10 appointments already scheduled     May 30, 2018  3:45 PM CDT   SHORT with Geena Jain MD   [facility]    [address] [place] [id number] [phone]              Who to contact     If you have questions or need follow up information about today's clinic visit or your schedule please contact [facility] directly at [phone].  Normal or non-critical lab and imaging results will be communicated to you by [place]  "letter or phone within 4 business days after the clinic has received the results. If you do not hear from us within 7 days, please contact the clinic through [employer] or phone. If you have a critical or abnormal lab result, we will notify you by phone as soon as possible.  Submit refill requests through [employer] or call your pharmacy and they will forward the refill request to us. Please allow 3 business days for your refill to be completed.          Additional Information About Your Visit        [employer] Information     [employer] gives you secure access to your electronic health record. If you see a primary care provider, you can also send messages to your care team and make appointments. If you have questions, please call your primary care clinic.  If you do not have a primary care provider, please call [phone] and they will assist you.        Care EveryWhere ID     This is your Care EveryWhere ID. This could be used by other organizations to access your [place] medical records  [id number]        Your Vitals Were     Pulse Temperature Respirations Height Last Period BMI (Body Mass Index)    68 98  F (36.7  C) (Oral) 16 5' 2.25\" (1.581 m) 02/25/2004 32.6 kg/m2       Blood Pressure from Last 3 Encounters:   05/09/18 110/80   05/04/18 125/90   04/25/18 116/82    Weight from Last 3 Encounters:   05/09/18 179 lb 11.2 oz (81.5 kg)   04/25/18 188 lb 3.2 oz (85.4 kg)   03/15/18 189 lb (85.7 kg)              We Performed the Following     Creatinine     EKG 12-lead complete w/read - Clinics     Hemoglobin     Platelet count        Primary Care Provider Office Phone # Fax #    Wing Olivia [phone]       [address] [facility] [address]        Equal Access to Services     CHASTITY CROSS : Lorenza Rizzo, wajennifer simental, qanick kaalmamaribel glaser, shanda boyd. So [facility] [phone].    ATENCIÓN: Si habla español, tiene a morrow disposición servicios gratuitos " de asistencia lingüística. Carlos Enrique al [phone].    We comply with applicable federal civil rights laws and [place] laws. We do not discriminate on the basis of race, color, national origin, age, disability, sex, sexual orientation, or gender identity.            Thank you!     Thank you for choosing [facility]  for your care. Our goal is always to provide you with excellent care. Hearing back from our patients is one way we can continue to improve our services. Please take a few minutes to complete the written survey that you may receive in the mail after your visit with us. Thank you!             Your Updated Medication List - Protect others around you: Learn how to safely use, store and throw away your medicines at www.disposemymeds.org.      Notice  As of 5/9/2018  1:46 PM    You have not been prescribed any medications.

## 2018-05-09 NOTE — PROGRESS NOTES
[address] [id number] [phone]  Dept: [phone]    PRE-OP EVALUATION:  Today's date: 2018    Nayeli Byrd (: 1955) presents for pre-operative evaluation assessment as requested by Dr. Peguero.  She requires evaluation and anesthesia risk assessment prior to undergoing surgery/procedure for treatment of Uterine polyp  hysteroscopy dilation and curettage, possible morcellation of polyp    Fax number for surgical facility: [facility] Same day surgery   Primary Physician: Wing Olivia  Type of Anesthesia Anticipated: Local     Patient has a Health Care Directive or Living Will:  NO    Preop Questions 2018   Who is doing your surgery? selvin peguero   What are you having done? uterus something   hysteroscopy dilation and curettage, possible morcellation of polyp   Date of Surgery/Procedure:  10 am    Facility or Hospital where procedure/surgery will be performed: [facility]   1.  Do you have a history of Heart attack, stroke, stent, coronary bypass surgery, or other heart surgery? No   2.  Do you ever have any pain or discomfort in your chest? No   3.  Do you have a history of  Heart Failure? No   4.   Are you troubled by shortness of breath when:  walking on a level surface, or up a slight hill, or at night? No   5.  Do you currently have a cold, bronchitis or other respiratory infection? No   6.  Do you have a cough, shortness of breath, or wheezing? No   7.  Do you sometimes get pains in the calves of your legs when you walk? No   8. Do you or anyone in your family have previous history of blood clots? No   9.  Do you or does anyone in your family have a serious bleeding problem such as prolonged bleeding following surgeries or cuts? No   10. Have you ever had problems with anemia or been told to take iron pills? No   11. Have you had any abnormal blood loss such as black, tarry or bloody stools, or abnormal vaginal bleeding? No    12. Have you ever had a blood transfusion? No   13. Have you or any of your relatives ever had problems with anesthesia? No   14. Do you have sleep apnea, excessive snoring or daytime drowsiness? No   15. Do you have any prosthetic heart valves? No   16. Do you have prosthetic joints? No   17. Is there any chance that you may be pregnant? No         HPI:     HPI related to upcoming procedure: endometrial cells on PAP, abnormal uterine ultrasound and possible polyp       See problem list for active medical problems.  Problems all longstanding and stable, except as noted/documented.  See ROS for pertinent symptoms related to these conditions.                                                                                                                                                          .    MEDICAL HISTORY:     Patient Active Problem List    Diagnosis Date Noted     Endometrial cells on cervical Pap smear inconsistent with last menstrual period 03/15/2018     Priority: Medium     3/15/18 NIL pap with endometrial cells in post menopausal pt, neg HR HPV. Plan pelvic ultrasound to assess the endometrial cavity, endometrial biopsy per provider         Non morbid obesity, unspecified obesity type 08/31/2017     Priority: Medium     Pain in shoulder 03/19/2013     Priority: Medium     Advanced directives, counseling/discussion 10/30/2012     Priority: Medium     Discussed advance care planning with patient; however, patient declined at this time. 10/30/2012          Colonic polyp      Priority: Medium     HYPERLIPIDEMIA LDL GOAL <130 10/31/2010     Priority: Medium      Past Medical History:   Diagnosis Date     Colonic polyp      Contact dermatitis and other eczema, due to unspecified cause      Depressive disorder, not elsewhere classified      Fracture, humerus closed Feb 2013    Closed left humeral head fracture.     Hyperlipidemia LDL goal <130       Morbid obesity (H)      Non morbid obesity, unspecified  obesity type 2017     Unspecified essential hypertension      Past Surgical History:   Procedure Laterality Date     C/SECTION, LOW TRANSVERSE      , Low Transverse     COLONOSCOPY       COLONOSCOPY N/A 2018    Procedure: COLONOSCOPY;  colonoscopy;  Surgeon: Carlo Huitron MD;  Location: [facility]     EYE SURGERY       KERATOTOMY ARCUATE WITH FEMTOSECOND LASER/IMAGING FOR ATIOL Right 2017    Procedure: KERATOTOMY ARCUATE WITH FEMTOSECOND LASER/IMAGING FOR ATIOL;  RIGHT EYE FEMTOSECOND LASER CAPSULOTOMY ; LENS FRAGMENTATION ; ARCUATE INCISIONS ;  Surgeon: Clemnete Sykes MD;  Location: [facility]     KERATOTOMY ARCUATE WITH FEMTOSECOND LASER/IMAGING FOR ATIOL Left 2017    Procedure: KERATOTOMY ARCUATE WITH FEMTOSECOND LASER/IMAGING FOR ATIOL;  LEFT  EYE FEMTOSECOND LASER CAPSULOTOMY, LENS FRAGMENTATION,  ARCUATE INCISIONS ;  Surgeon: Clemente Sykes MD;  Location: [facility]     ORTHOPEDIC SURGERY      right foot surgery     PHACOEMULSIFICATION CLEAR CORNEA WITH DELUXE INTRAOCULAR LENS IMPLANT Right 2017    Procedure: PHACOEMULSIFICATION CLEAR CORNEA WITH DELUXE INTRAOCULAR LENS IMPLANT;  RIGHT EYE PHACOEMULSIFICATION CLEAR CORNEA WITH DELUXE INTRAOCULAR LENS IMPLANT ;  Surgeon: Clemente Sykes MD;  Location: [facility]     PHACOEMULSIFICATION CLEAR CORNEA WITH DELUXE INTRAOCULAR LENS IMPLANT Left 2017    Procedure: PHACOEMULSIFICATION CLEAR CORNEA WITH DELUXE INTRAOCULAR LENS IMPLANT;  LEFT EYE  FEMTOSECOND LASER ASSISTED PHACOEMULSIFICATION CLEAR CORNEA WITH DELUXE INTRAOCULAR LENS IMPLANT ;  Surgeon: Clemente Sykes MD;  Location: [facility]     No current outpatient prescriptions on file.     OTC products: no recent use of OTC ASA, NSAIDS or Steroids    Allergies   Allergen Reactions     Hctz      Renal insufficiency      Latex Allergy: NO    Social History   Substance Use Topics     Smoking status: Never Smoker     Smokeless tobacco: Former User     Alcohol  "use Yes      Comment: 2 per month     History   Drug Use No       REVIEW OF SYSTEMS:   CONSTITUTIONAL: NEGATIVE for fever, chills, change in weight  INTEGUMENTARY/SKIN: NEGATIVE for worrisome rashes, moles or lesions  EYES: NEGATIVE for vision changes or irritation  ENT/MOUTH: NEGATIVE for ear, mouth and throat problems  RESP: NEGATIVE for significant cough or SOB  CV: NEGATIVE for chest pain, palpitations or peripheral edema  GI: NEGATIVE for nausea, abdominal pain, heartburn, or change in bowel habits  MUSCULOSKELETAL: NEGATIVE for significant arthralgias or myalgia  ENDOCRINE: NEGATIVE for temperature intolerance, skin/hair changes  HEME/ALLERGY/IMMUNE: NEGATIVE for bleeding problems  PSYCHIATRIC: NEGATIVE for changes in mood or affect  ROS otherwise negative    EXAM:   /80 (BP Location: Left arm, Patient Position: Chair, Cuff Size: Adult Regular)  Pulse 68  Temp 98  F (36.7  C) (Oral)  Resp 16  Ht 5' 2.25\" (1.581 m)  Wt 179 lb 11.2 oz (81.5 kg)  LMP 02/25/2004  BMI 32.6 kg/m2  GENERAL APPEARANCE: healthy, alert and no distress  EYES: Eyes grossly normal to inspection, PERRL and conjunctivae and sclerae normal  HENT: ear canals and TM's normal and nose and mouth without ulcers or lesions  RESP: lungs clear to auscultation - no rales, rhonchi or wheezes  CV: regular rate and rhythm, normal S1 S2, no S3 or S4 and no murmur, click or rub   ABDOMEN: soft, nontender, no HSM or masses and bowel sounds normal  MS: extremities normal- no gross deformities noted  SKIN: no suspicious lesions or rashes  NEURO: Normal strength and tone, sensory exam grossly normal, mentation intact and speech normal  PSYCH: mentation appears normal and affect normal/bright    DIAGNOSTICS:     EKG: appears normal, NSR, normal axis, normal intervals, no acute ST/T changes c/w ischemia, no LVH by voltage criteria, unchanged from previous tracings  Labs Drawn and in Process:   Unresulted Labs Ordered in the Past 30 Days of this " Admission     Date and Time Order Name Status Description    5/9/2018 1331 CREATININE In process     5/9/2018 1331 PLATELET COUNT In process     5/9/2018 1331 HEMOGLOBIN In process           Recent Labs   Lab Test  09/13/17   0731  10/30/12   0935   HGB   --   15.5   PLT   --   190   NA  141  143   POTASSIUM  4.0  4.2   CR  0.94  0.63        IMPRESSION:   Reason for surgery/procedure: endometrial cells on PAP, Uterine polyp and abnormal uterine ultrasound   Diagnosis/reason for consult: cardiopulmonary and clearance for surgery     The proposed surgical procedure is considered INTERMEDIATE risk.    REVISED CARDIAC RISK INDEX  The patient has the following serious cardiovascular risks for perioperative complications such as (MI, PE, VFib and 3  AV Block):  No serious cardiac risks  INTERPRETATION: 0 risks: Class I (very low risk - 0.4% complication rate)    The patient has the following additional risks for perioperative complications:  No identified additional risks      ICD-10-CM    1. Preop general physical exam Z01.818 EKG 12-lead complete w/read - Clinics     Hemoglobin     Platelet count     Creatinine   2. Endometrial cells on cervical Pap smear inconsistent with last menstrual period R87.619 EKG 12-lead complete w/read - Clinics     Hemoglobin     Platelet count     Creatinine   3. Uterine polyp N84.0 EKG 12-lead complete w/read - Clinics     Hemoglobin     Platelet count     Creatinine   4. Abnormal ultrasound of uterus R93.5 EKG 12-lead complete w/read - Clinics     Hemoglobin     Platelet count     Creatinine       RECOMMENDATIONS:         --Patient is to take all scheduled medications on the day of surgery EXCEPT for modifications listed below.  --Patient is on no chronic medications  Reviewed NO NSAID as of today  Tylenol ok if needed.  Follow instructions on NPO after midnight and any special bathing instructions.     APPROVAL GIVEN to proceed with proposed procedure, without further diagnostic  evaluation       Signed Electronically by: Basilia Hopper PA-C    Copy of this evaluation report is provided to requesting physician.    [facility] [facility] Preop Guidelines    Revised Cardiac Risk Index

## 2018-05-16 ENCOUNTER — HOSPITAL PATHOLOGY (OUTPATIENT)
Dept: OTHER | Facility: CLINIC | Age: 63
End: 2018-05-16

## 2018-05-17 LAB — COPATH REPORT: NORMAL

## 2018-06-14 ENCOUNTER — OFFICE VISIT (OUTPATIENT)
Dept: OBGYN | Facility: CLINIC | Age: 63
End: 2018-06-14
Payer: COMMERCIAL

## 2018-06-14 VITALS
HEART RATE: 70 BPM | SYSTOLIC BLOOD PRESSURE: 112 MMHG | DIASTOLIC BLOOD PRESSURE: 72 MMHG | BODY MASS INDEX: 32.48 KG/M2 | WEIGHT: 179 LBS

## 2018-06-14 DIAGNOSIS — N84.0 ENDOMETRIUM, POLYP: Primary | ICD-10-CM

## 2018-06-14 PROCEDURE — 99212 OFFICE O/P EST SF 10 MIN: CPT | Performed by: OBSTETRICS & GYNECOLOGY

## 2018-06-14 NOTE — PROGRESS NOTES
"Gyn Clinic Postoperative Visit Note  2018    S: Lindsey Byrd is a 62 year old  here for post-operative visit following hysteroscopy polypectomy on 18 for endometrial cells on her pap smear with hyperechoic area on US. She is feeling great. Had a few days of spotting, now resolved.  no fevers/chills.  no abdominal pain.    O:   /72 (BP Location: Right arm, Patient Position: Chair, Cuff Size: Adult Large)  Pulse 70  Wt 179 lb (81.2 kg)  LMP 2004  BMI 32.48 kg/m2  Gen: sitting in chair in no acute distress, comfortable  Skin: warm and dry, no rashes/lesions  Psych: mood stable, appropriate affect  Neuro: A+Ox3     Labs:   Hemoglobin   Date Value Ref Range Status   2018 14.0 11.7 - 15.7 g/dL Final   ]    Pathology:   Copath Report   Date Value Ref Range Status   2018   Final    Patient Name: LINDSEY BYRD  MR#: [id number]  Specimen #: [id number]  Collected: 2018  Received: 2018  Reported: 2018 12:18  Ordering Phy(s): MILEY JUÁREZ    For improved result formatting, select 'View Enhanced Report Format' under   Linked Documents section.    SPECIMEN(S):  A: Endometrial polyp  B: Endometrial curettings and cervical polyp    FINAL DIAGNOSIS:  A: Endometrium, polyp, biopsy -  - Fragments of benign endometrial polyp(s).  - Background endometrium showing cystic atrophy.  - Negative for hyperplasia, atypia, or malignancy.    B: Endometrium and cervical polyp, curettings/biopsy-  - Endometrium showing cystic atrophy; negative for endometrial   hyperplasia.  - Fragments of benign endocervical polyp(s).  - Negative for malignancy.    Electronically signed out by:    Alanis Devries M.D.    CLINICAL HISTORY:  Abnormal ultrasound findings, suspicious for polyp, endometrial cells on   Pap smear.    GROSS:  A:  The specimen is received in formalin labeled with the patient's name,   identifying information and  designated \"endometrial polyp\".  It consists of " "multiple pink rubbery   tissue fragments, aggregating to 1.5 x 1  x 0.3 cm.  Submitted entirely in one block.    B:  The specimen is received in formalin labeled with the patient's name,   identifying information and  designated \"endometrial curettings and polyp\".  It consists of multiple   pink-gray tissue fragments,  aggregating to 1.5 x 1.5 x 0.3 cm.  Submitted entirely in one block.   (Dictated by: RYAN Alvarez 5/16/2018  03:28 PM)    MICROSCOPIC:  A and B.  Microscopic examination is performed.    CPT Codes:  A: 31214-TH7  B: 58970-SI0    TESTING LAB LOCATION:  [facility] [address] [place]  [address] [phone]    COLLECTION SITE:  Client: [facility]  Location: R548 ()     ]    A: 62 year old female 4w post-op s/p hysteroscopy D&C, polypectomy. Doing well, no concerns.     P:   Return to clinic for annual breast and pelvic exams or for any abnormal bleeding.     Geena Jain MD   OB/Gyn  6/14/2018    "

## 2018-06-14 NOTE — MR AVS SNAPSHOT
After Visit Summary   6/14/2018    Nayeli Byrd    MRN: [id number]           Patient Information     Date Of Birth          1955        Visit Information        Provider Department      6/14/2018 2:30 PM Geena Jain MD [facility]        Today's Diagnoses     Endometrium, polyp    -  1       Follow-ups after your visit        Who to contact     If you have questions or need follow up information about today's clinic visit or your schedule please contact [facility] directly at [phone].  Normal or non-critical lab and imaging results will be communicated to you by MyChart, letter or phone within 4 business days after the clinic has received the results. If you do not hear from us within 7 days, please contact the clinic through [employer] or phone. If you have a critical or abnormal lab result, we will notify you by phone as soon as possible.  Submit refill requests through [employer] or call your pharmacy and they will forward the refill request to us. Please allow 3 business days for your refill to be completed.          Additional Information About Your Visit        MyChart Information     [employer] gives you secure access to your electronic health record. If you see a primary care provider, you can also send messages to your care team and make appointments. If you have questions, please call your primary care clinic.  If you do not have a primary care provider, please call [phone] and they will assist you.        Care EveryWhere ID     This is your Care EveryWhere ID. This could be used by other organizations to access your [facility] medical records  [id number]        Your Vitals Were     Pulse Last Period BMI (Body Mass Index)             70 02/25/2004 32.48 kg/m2          Blood Pressure from Last 3 Encounters:   06/14/18 112/72   05/09/18 110/80   05/04/18 125/90    Weight from Last 3 Encounters:   06/14/18 179 lb (81.2 kg)   05/09/18 179 lb 11.2  oz (81.5 kg)   04/25/18 188 lb 3.2 oz (85.4 kg)              Today, you had the following     No orders found for display       Primary Care Provider Office Phone # Fax #    Wing Olivia [phone]       [address] [facility] [address]        Equal Access to Services     [facility] : Hadii aad ku hadasho Soomaali, waaxda luqadaha, qaybta kaalmada adeegyada, waxay idiin hayaan adeeg kharash la'aan . So [facility] [phone].    ATENCIÓN: Si habla español, tiene a morrow disposición servicios gratuitos de asistencia lingüística. Llame al [phone].    We comply with applicable federal civil rights laws and [place] laws. We do not discriminate on the basis of race, color, national origin, age, disability, sex, sexual orientation, or gender identity.            Thank you!     Thank you for choosing [facility]  for your care. Our goal is always to provide you with excellent care. Hearing back from our patients is one way we can continue to improve our services. Please take a few minutes to complete the written survey that you may receive in the mail after your visit with us. Thank you!             Your Updated Medication List - Protect others around you: Learn how to safely use, store and throw away your medicines at www.disposemymeds.org.      Notice  As of 6/14/2018  2:58 PM    You have not been prescribed any medications.

## 2018-10-17 ENCOUNTER — ALLIED HEALTH/NURSE VISIT (OUTPATIENT)
Dept: NURSING | Facility: CLINIC | Age: 63
End: 2018-10-17
Payer: COMMERCIAL

## 2018-10-17 DIAGNOSIS — Z23 NEED FOR PROPHYLACTIC VACCINATION AND INOCULATION AGAINST INFLUENZA: Primary | ICD-10-CM

## 2018-10-17 PROCEDURE — 90682 RIV4 VACC RECOMBINANT DNA IM: CPT

## 2018-10-17 PROCEDURE — 90471 IMMUNIZATION ADMIN: CPT

## 2018-10-17 NOTE — PROGRESS NOTES

## 2018-10-17 NOTE — MR AVS SNAPSHOT
After Visit Summary   10/17/2018    Nayeli Byrd    MRN: [id number]           Patient Information     Date Of Birth          1955        Visit Information        Provider Department      10/17/2018 1:30 PM [facility] PEDIATRICS - NURSE [facility]        Today's Diagnoses     Need for prophylactic vaccination and inoculation against influenza    -  1       Follow-ups after your visit        Your next 10 appointments already scheduled     Nov 08, 2018 10:00 AM CST   LAB with [facility]   [facility]    [address] [place] [address]   [phone]           Please do not eat 10-12 hours before your appointment if you are coming in fasting for labs on lipids, cholesterol, or glucose (sugar). This does not apply to pregnant women. Water, hot tea and black coffee (with nothing added) are okay. Do not drink other fluids, diet soda or chew gum.            Nov 13, 2018 10:00 AM CST   MA SCREENING DIGITAL BILATERAL with [facility]   [facility]    [address] [place] [address]   [phone]           How do I prepare for my exam? (Food and drink instructions) No Food and Drink Restrictions.  How do I prepare for my exam? (Other instructions) Do not use any powder, lotion or deodorant under your arms or on your breast. If you do, we will ask you to remove it before your exam.  What should I wear: Wear comfortable, two-piece clothing.  How long does the exam take: Most scans will take 15 minutes.  What should I bring: Bring any previous mammograms from other facilities or have them mailed to the breast center.  Do I need a :  No  is needed.  What do I need to tell my doctor: If you have any allergies, tell your care team.  What should I do after the exam: No restrictions, You may resume normal activities.  What is  "this test: This test is an x-ray of the breast to look for breast disease. The breast is pressed between two plates to flatten and spread the tissue. An X-ray is taken of the breast from different angles.  Who should I call with questions: If you have any questions, please call the Imaging Department where you will have your exam. Directions, parking instructions, and other information is available on our website, [email or web address].  Other information about my exam Three-dimensional (3D) mammograms are available at [facility] locations in [facility], [facility], [place], [facility] and [place]. [facility] locations include [place] and the [facility] in [place].  Benefits of 3D mammograms include * Improved rate of cancer detection * Decreases your chance of having to go back for more tests, which means fewer: * \"False-positive\" results (This means that there is an abnormal area but it isn't cancer.) * Invasive testing procedures, such as a biopsy or surgery * Can provide clearer images of the breast if you have dense breast tissue.  *3D mammography is an optional exam that anyone can have with a 2D mammogram. It doesn't replace or take the place of a 2D mammogram. 2D mammograms remain an effective screening test for all women.  Not all insurance companies cover the cost of a 3D mammogram. Check with your insurance. Three-dimensional (3D) mammograms are available at [facility] locations in [facility], [facility], [facility], and [place]. Health locations include [place] and [facility] in [place]. Benefits of 3D mammograms include: - Improved rate of cancer detection - Decreases your chance of having to go back for more tests, which means fewer: - \"False-positive\" results (This means that there is an abnormal area but it isn't cancer.) - Invasive testing procedures, such as a biopsy or surgery - Can " provide clearer images of the breast if you have dense breast tissue. 3D mammography is an optional exam that anyone can have with a 2D mammogram. It doesn't replace or take the place of a 2D mammogram. 2D mammograms remain an effective screening test for all women.  Not all insurance companies cover the cost of a 3D mammogram. Check with your insurance.              Who to contact     If you have questions or need follow up information about today's clinic visit or your schedule please contact [facility] directly at [phone].  Normal or non-critical lab and imaging results will be communicated to you by [employer], letter or phone within 4 business days after the clinic has received the results. If you do not hear from us within 7 days, please contact the clinic through [employer] or phone. If you have a critical or abnormal lab result, we will notify you by phone as soon as possible.  Submit refill requests through [employer] or call your pharmacy and they will forward the refill request to us. Please allow 3 business days for your refill to be completed.          Additional Information About Your Visit        [employer] Information     [employer] gives you secure access to your electronic health record. If you see a primary care provider, you can also send messages to your care team and make appointments. If you have questions, please call your primary care clinic.  If you do not have a primary care provider, please call [phone] and they will assist you.        Care EveryWhere ID     This is your Care EveryWhere ID. This could be used by other organizations to access your [facility] medical records  [id number]        Your Vitals Were     Last Period                   02/25/2004            Blood Pressure from Last 3 Encounters:   06/14/18 112/72   05/09/18 110/80   05/04/18 125/90    Weight from Last 3 Encounters:   06/14/18 179 lb (81.2 kg)   05/09/18 179 lb 11.2 oz (81.5 kg)   04/25/18 188 lb  3.2 oz (85.4 kg)              We Performed the Following     FLU VACCINE, (RIV4) RECOMBINANT HA  , IM (FluBlok, egg free) [15876]- >18 YRS (G recommended  50-64 YRS)     Vaccine Administration, Initial [04295]        Primary Care Provider Office Phone # Fax #    Wing Olivia [phone]       [address] [facility] [address]        Equal Access to Services     CHAST[facility] CROSS : Hadii aad ku hadasho Soomaali, waaxda luqadaha, qaybta kaalmada adeegyada, waxay idiin hayaan adeeg vanarafatemeh lanelson . So [facility] [phone].    ATENCIÓN: Si habla español, tiene a morrow disposición servicios gratuitos de asistencia lingüística. [facility] [phone].    We comply with applicable federal civil rights laws and [place] laws. We do not discriminate on the basis of race, color, national origin, age, disability, sex, sexual orientation, or gender identity.            Thank you!     Thank you for choosing [facility]  for your care. Our goal is always to provide you with excellent care. Hearing back from our patients is one way we can continue to improve our services. Please take a few minutes to complete the written survey that you may receive in the mail after your visit with us. Thank you!             Your Updated Medication List - Protect others around you: Learn how to safely use, store and throw away your medicines at www.disposemymeds.org.      Notice  As of 10/17/2018  1:35 PM    You have not been prescribed any medications.

## 2018-11-06 DIAGNOSIS — Z13.6 CARDIOVASCULAR SCREENING; LDL GOAL LESS THAN 130: ICD-10-CM

## 2018-11-06 DIAGNOSIS — Z13.1 SCREENING FOR DIABETES MELLITUS: ICD-10-CM

## 2018-11-06 LAB
ANION GAP SERPL CALCULATED.3IONS-SCNC: 8 MMOL/L (ref 3–14)
BUN SERPL-MCNC: 18 MG/DL (ref 7–30)
CALCIUM SERPL-MCNC: 9.1 MG/DL (ref 8.5–10.1)
CHLORIDE SERPL-SCNC: 102 MMOL/L (ref 94–109)
CHOLEST SERPL-MCNC: 252 MG/DL
CO2 SERPL-SCNC: 26 MMOL/L (ref 20–32)
CREAT SERPL-MCNC: 0.81 MG/DL (ref 0.52–1.04)
GFR SERPL CREATININE-BSD FRML MDRD: 71 ML/MIN/1.7M2
GLUCOSE SERPL-MCNC: 97 MG/DL (ref 70–99)
HDLC SERPL-MCNC: 69 MG/DL
LDLC SERPL CALC-MCNC: 169 MG/DL
NONHDLC SERPL-MCNC: 183 MG/DL
POTASSIUM SERPL-SCNC: 4.3 MMOL/L (ref 3.4–5.3)
SODIUM SERPL-SCNC: 136 MMOL/L (ref 133–144)
TRIGL SERPL-MCNC: 68 MG/DL

## 2018-11-06 PROCEDURE — 80048 BASIC METABOLIC PNL TOTAL CA: CPT | Performed by: INTERNAL MEDICINE

## 2018-11-06 PROCEDURE — 80061 LIPID PANEL: CPT | Performed by: INTERNAL MEDICINE

## 2018-11-06 PROCEDURE — 36415 COLL VENOUS BLD VENIPUNCTURE: CPT | Performed by: INTERNAL MEDICINE

## 2018-11-10 ENCOUNTER — MYC MEDICAL ADVICE (OUTPATIENT)
Dept: INTERNAL MEDICINE | Facility: CLINIC | Age: 63
End: 2018-11-10

## 2018-11-10 DIAGNOSIS — E78.5 HYPERLIPIDEMIA LDL GOAL <130: Primary | ICD-10-CM

## 2018-11-10 DIAGNOSIS — Z11.59 NEED FOR HEPATITIS C SCREENING TEST: ICD-10-CM

## 2018-11-10 DIAGNOSIS — Z13.1 SCREENING FOR DIABETES MELLITUS: ICD-10-CM

## 2018-11-13 ENCOUNTER — HOSPITAL ENCOUNTER (OUTPATIENT)
Dept: MAMMOGRAPHY | Facility: CLINIC | Age: 63
Discharge: HOME | End: 2018-11-13
Attending: OBSTETRICS & GYNECOLOGY | Admitting: OBSTETRICS & GYNECOLOGY

## 2018-11-13 DIAGNOSIS — Z12.31 VISIT FOR SCREENING MAMMOGRAM: ICD-10-CM

## 2018-11-13 PROCEDURE — 77063 BREAST TOMOSYNTHESIS BI: CPT

## 2020-02-10 ENCOUNTER — HEALTH MAINTENANCE LETTER (OUTPATIENT)
Age: 65
End: 2020-02-10

## 2020-11-16 ENCOUNTER — HEALTH MAINTENANCE LETTER (OUTPATIENT)
Age: 65
End: 2020-11-16

## 2021-03-09 ENCOUNTER — IMMUNIZATION (OUTPATIENT)
Dept: NURSING | Facility: CLINIC | Age: 66
End: 2021-03-09
Payer: MEDICARE

## 2021-03-09 PROCEDURE — 0011A PR COVID VAC MODERNA 100 MCG/0.5 ML IM: CPT

## 2021-03-09 PROCEDURE — 91301 PR COVID VAC MODERNA 100 MCG/0.5 ML IM: CPT

## 2021-04-04 ENCOUNTER — HEALTH MAINTENANCE LETTER (OUTPATIENT)
Age: 66
End: 2021-04-04

## 2021-04-06 ENCOUNTER — IMMUNIZATION (OUTPATIENT)
Dept: NURSING | Facility: CLINIC | Age: 66
End: 2021-04-06
Attending: INTERNAL MEDICINE
Payer: MEDICARE

## 2021-04-06 PROCEDURE — 0012A PR COVID VAC MODERNA 100 MCG/0.5 ML IM: CPT

## 2021-04-06 PROCEDURE — 91301 PR COVID VAC MODERNA 100 MCG/0.5 ML IM: CPT

## 2021-04-17 ENCOUNTER — OFFICE VISIT (OUTPATIENT)
Dept: URGENT CARE | Facility: URGENT CARE | Age: 66
End: 2021-04-17
Payer: MEDICARE

## 2021-04-17 ENCOUNTER — ANCILLARY PROCEDURE (OUTPATIENT)
Dept: GENERAL RADIOLOGY | Facility: CLINIC | Age: 66
End: 2021-04-17
Attending: PHYSICIAN ASSISTANT
Payer: MEDICARE

## 2021-04-17 VITALS
DIASTOLIC BLOOD PRESSURE: 90 MMHG | BODY MASS INDEX: 29.47 KG/M2 | WEIGHT: 162.4 LBS | OXYGEN SATURATION: 98 % | SYSTOLIC BLOOD PRESSURE: 143 MMHG | TEMPERATURE: 99.5 F | HEART RATE: 65 BPM | RESPIRATION RATE: 20 BRPM

## 2021-04-17 DIAGNOSIS — S52.691A OTHER CLOSED FRACTURE OF DISTAL END OF RIGHT ULNA, INITIAL ENCOUNTER: Primary | ICD-10-CM

## 2021-04-17 DIAGNOSIS — S69.91XA WRIST INJURY, RIGHT, INITIAL ENCOUNTER: ICD-10-CM

## 2021-04-17 PROCEDURE — 99214 OFFICE O/P EST MOD 30 MIN: CPT | Mod: 25 | Performed by: PHYSICIAN ASSISTANT

## 2021-04-17 PROCEDURE — 73110 X-RAY EXAM OF WRIST: CPT | Mod: RT | Performed by: RADIOLOGY

## 2021-04-17 PROCEDURE — 29125 APPL SHORT ARM SPLINT STATIC: CPT | Performed by: PHYSICIAN ASSISTANT

## 2021-04-17 RX ORDER — HYDROCODONE BITARTRATE AND ACETAMINOPHEN 5; 325 MG/1; MG/1
1 TABLET ORAL EVERY 6 HOURS PRN
Qty: 18 TABLET | Refills: 0 | Status: SHIPPED | OUTPATIENT
Start: 2021-04-17 | End: 2021-04-20

## 2021-04-17 NOTE — PATIENT INSTRUCTIONS
(S52.691A) Other closed fracture of distal end of right ulna, initial encounter  (primary encounter diagnosis)  Comment:   Plan: HYDROcodone-acetaminophen (NORCO) 5-325 MG         tablet         Loosen ace wrap if too tight    Follow up with Orthopedics within 72 hours.    Elevate as much as possible    (S69.91XA) Wrist injury, right, initial encounter  Comment:   Plan: XR Wrist Right G/E 3 Views              Patient Education     Treating Wrist Fractures  A fractured bone starts to heal on its own right away. But a treatment called reduction may help you heal better. Reduction is a process that repositions your bones. The goal is to get them as close as possible to how they were before the break. Your healthcare provider will use one or more methods of reduction.      A plate with tiny screws helps keep the bone stable and in place.      An external fixator is a rigid bar that screws into the bone through tiny holes made in the skin. It holds the fractured segments of bone in place. A plate with tiny screws helps keep the bone stable and in place.      Closed reduction  If you have a clean break with little soft tissue damage, closed reduction will likely be used. Before the procedure, you may be given medicine to relax your muscles. Then your healthcare provider manually readjusts the position of the broken bone. A splint or cast will be worn while you heal.  Open reduction  If you have an open fracture (bone sticking out through the skin), badly misaligned sections of bone, or severe tissue injury, open reduction may be needed. You may be given medicine during the procedure to let you sleep and relax your muscles. Your healthcare provider then makes one or more cuts (incisions) to realign the bone and fix soft tissues. Pins, screws, plates, or a combination of implants may be used under the skin to hold the bone in place during healing. Another device that may be used is an external fixator. It holds the bones in  the right position. It is surgically placed on the outside of the skin.  The road to healing  Fractures take about 6 weeks or more to heal. Keeping your hand raised above your heart can help control swelling, throbbing, and pain. Your healthcare provider may give you medicine to help ease pain. Don t remove a splint unless your healthcare provider says you can. Call your healthcare provider if your pain gets worse or if you notice a lot of swelling or redness. Sometimes these implants, especially wires and external fixators, may need to be removed after the broken bone has healed.   [email or web address] last reviewed this educational content on 1/1/2018 2000-2021 [employer]. All rights reserved. This information is not intended as a substitute for professional medical care. Always follow your healthcare professional's instructions.

## 2021-04-17 NOTE — PROGRESS NOTES
Patient presents with:  Arm Injury: Rt arm injury x today around 1 pm.     (S52.691A) Other closed fracture of distal end of right ulna, initial encounter  (primary encounter diagnosis)  Comment:   Plan: HYDROcodone-acetaminophen (NORCO) 5-325 MG         tablet         Loosen ace wrap if too tight    Follow up with Orthopedics within 72 hours.    Elevate as much as possible    (S69.91XA) Wrist injury, right, initial encounter  Comment:   Plan: XR Wrist Right G/E 3 Views          Sling    SUBJECTIVE:   Nayeli Byrd is a 65 year old female who presents today with right wrist pain, swelling and bruising since accidentally hitting her wrist on a door earlier today. Pain with movement and to touch.       Denies any other injury.      Right handed    Normal bone density test in 2010.    Past Medical History:   Diagnosis Date     Colonic polyp      Contact dermatitis and other eczema, due to unspecified cause      Depressive disorder, not elsewhere classified      Fracture, humerus closed Feb 2013    Closed left humeral head fracture.     Hyperlipidemia LDL goal <130       Morbid obesity (H)      Non morbid obesity, unspecified obesity type 8/31/2017     Unspecified essential hypertension          Current Outpatient Medications   Medication Sig Dispense Refill     Multiple Vitamins-Iron (DAILY-CHRALES/IRON/BETA-CAROTENE) TABS TAKE 1 TABLET BY MOUTH DAILY. (Patient not taking: Reported on 10/19/2020) 30 tablet 7     Social History     Tobacco Use     Smoking status: Never Smoker     Smokeless tobacco: Never Used   Substance Use Topics     Alcohol use: Not on file     Family History   Problem Relation Age of Onset     Diabetes Mother      Diabetes Father          ROS:    10 point ROS of systems including Constitutional, Eyes, Respiratory, Cardiovascular, Gastroenterology, Genitourinary, Integumentary, Muscularskeletal, Psychiatric ,neurological were all negative except for pertinent positives noted in my HPI        OBJECTIVE:  BP (!) 143/90   Pulse 65   Temp 99.5  F (37.5  C) (Temporal)   Resp 20   Wt 73.7 kg (162 lb 6.4 oz)   LMP 02/25/2004   SpO2 98%   BMI 29.47 kg/m      Physical Exam:  GENERAL APPEARANCE: healthy, alert and no distress  Extremities: swelling and ecchymosis of ulnar side of wrist with ecchymosis.  ROM at wrist is decreased secondary to pain.    NEURO: Normal strength and tone, sensory exam grossly normal,  normal speech and mentation  SKIN: no suspicious lesions or rashes    X-Ray was done, my findings are: mildly angulated fracture of the ulna.    PROCEDURE: 17 x 4 inch gel cast splint with ample cast padding applied to wrist in position of function by me.  Ace wrap to hold.  Sling.

## 2021-04-19 ENCOUNTER — TRANSFERRED RECORDS (OUTPATIENT)
Dept: HEALTH INFORMATION MANAGEMENT | Facility: CLINIC | Age: 66
End: 2021-04-19

## 2021-04-26 ENCOUNTER — TRANSFERRED RECORDS (OUTPATIENT)
Dept: HEALTH INFORMATION MANAGEMENT | Facility: CLINIC | Age: 66
End: 2021-04-26

## 2021-05-10 ENCOUNTER — TRANSFERRED RECORDS (OUTPATIENT)
Dept: HEALTH INFORMATION MANAGEMENT | Facility: CLINIC | Age: 66
End: 2021-05-10

## 2021-05-24 ENCOUNTER — TRANSFERRED RECORDS (OUTPATIENT)
Dept: HEALTH INFORMATION MANAGEMENT | Facility: CLINIC | Age: 66
End: 2021-05-24

## 2021-09-18 ENCOUNTER — HEALTH MAINTENANCE LETTER (OUTPATIENT)
Age: 66
End: 2021-09-18

## 2021-09-30 ENCOUNTER — ANCILLARY PROCEDURE (OUTPATIENT)
Dept: MAMMOGRAPHY | Facility: CLINIC | Age: 66
End: 2021-09-30
Attending: INTERNAL MEDICINE
Payer: MEDICARE

## 2021-09-30 DIAGNOSIS — Z12.31 VISIT FOR SCREENING MAMMOGRAM: ICD-10-CM

## 2021-09-30 PROCEDURE — 77067 SCR MAMMO BI INCL CAD: CPT | Mod: TC | Performed by: RADIOLOGY

## 2021-09-30 PROCEDURE — 77063 BREAST TOMOSYNTHESIS BI: CPT | Mod: TC | Performed by: RADIOLOGY

## 2021-12-17 ENCOUNTER — IMMUNIZATION (OUTPATIENT)
Dept: FAMILY MEDICINE | Facility: CLINIC | Age: 66
End: 2021-12-17
Payer: MEDICARE

## 2021-12-17 PROCEDURE — 0064A COVID-19,PF,MODERNA (18+ YRS BOOSTER .25ML): CPT

## 2021-12-17 PROCEDURE — 91306 COVID-19,PF,MODERNA (18+ YRS BOOSTER .25ML): CPT

## 2022-01-08 ENCOUNTER — HEALTH MAINTENANCE LETTER (OUTPATIENT)
Age: 67
End: 2022-01-08

## 2022-11-20 ENCOUNTER — HEALTH MAINTENANCE LETTER (OUTPATIENT)
Age: 67
End: 2022-11-20

## 2022-11-26 ENCOUNTER — OFFICE VISIT (OUTPATIENT)
Dept: URGENT CARE | Facility: URGENT CARE | Age: 67
End: 2022-11-26
Payer: MEDICARE

## 2022-11-26 VITALS
BODY MASS INDEX: 32.66 KG/M2 | SYSTOLIC BLOOD PRESSURE: 139 MMHG | OXYGEN SATURATION: 99 % | TEMPERATURE: 99.7 F | WEIGHT: 180 LBS | DIASTOLIC BLOOD PRESSURE: 89 MMHG | HEART RATE: 68 BPM | RESPIRATION RATE: 16 BRPM

## 2022-11-26 DIAGNOSIS — J01.90 ACUTE SINUSITIS WITH SYMPTOMS > 10 DAYS: Primary | ICD-10-CM

## 2022-11-26 DIAGNOSIS — R05.1 ACUTE COUGH: ICD-10-CM

## 2022-11-26 PROCEDURE — 99214 OFFICE O/P EST MOD 30 MIN: CPT | Performed by: PHYSICIAN ASSISTANT

## 2022-11-26 RX ORDER — ALBUTEROL SULFATE 90 UG/1
2 AEROSOL, METERED RESPIRATORY (INHALATION) EVERY 6 HOURS
Qty: 18 G | Refills: 0 | Status: SHIPPED | OUTPATIENT
Start: 2022-11-26 | End: 2023-11-26

## 2022-11-26 RX ORDER — DOXYCYCLINE 100 MG/1
100 CAPSULE ORAL 2 TIMES DAILY
Qty: 20 CAPSULE | Refills: 0 | Status: SHIPPED | OUTPATIENT
Start: 2022-11-26 | End: 2022-12-06

## 2022-11-26 RX ORDER — CODEINE PHOSPHATE AND GUAIFENESIN 10; 100 MG/5ML; MG/5ML
1-2 SOLUTION ORAL EVERY 4 HOURS PRN
Qty: 180 ML | Refills: 0 | Status: SHIPPED | OUTPATIENT
Start: 2022-11-26

## 2022-11-26 NOTE — PATIENT INSTRUCTIONS
(J01.90) Acute sinusitis with symptoms > 10 days  (primary encounter diagnosis)  Comment:   Plan: doxycycline hyclate (VIBRAMYCIN) 100 MG capsule            (R05.1) Acute cough  Comment:   Plan: albuterol (PROAIR HFA/PROVENTIL HFA/VENTOLIN         HFA) 108 (90 Base) MCG/ACT inhaler,         guaiFENesin-codeine (ROBITUSSIN AC) 100-10         MG/5ML solution            Rest.    Follow up with primary clinic should symptoms persist or worsen.    Keep water intake good.      Saline nasal spray

## 2022-11-26 NOTE — PROGRESS NOTES
Patient presents with:  URI: Headache, cough, nasal congestion feels fatigue for the last 2 weeks.     (J01.90) Acute sinusitis with symptoms > 10 days  (primary encounter diagnosis)  Comment:   Plan: doxycycline hyclate (VIBRAMYCIN) 100 MG capsule            (R05.1) Acute cough  Comment:   Plan: albuterol (PROAIR HFA/PROVENTIL HFA/VENTOLIN         HFA) 108 (90 Base) MCG/ACT inhaler,         guaiFENesin-codeine (ROBITUSSIN AC) 100-10         MG/5ML solution          Rest.  No given for work.    Follow up with primary clinic should symptoms persist or worsen.    Keep water intake good.      Saline nasal spray    If not improving or if condition worsens, follow up with your Primary Care Provider    37 minutes spent on the date of the encounter doing chart review, patient visit and documentation       SUBJECTIVE:   Nayeli Byrd is a 67 year old female who presents today with a wet sounding cough for the past 2 weeks, persisting.      Has had a negative home covid test.     Low grade fever in clinic today.      Sinus pressure, has taken mucinex.      Traveled to [place] for 10 days, arrived home on 10/22/22.  Had similar symptoms for 10 days, but they resolved and are now back.      Does feel short of breath and weak, denies any wheezing.          Past Medical History:   Diagnosis Date     Colonic polyp      Contact dermatitis and other eczema, due to unspecified cause      Depressive disorder, not elsewhere classified      Fracture, humerus closed Feb 2013    Closed left humeral head fracture.     Hyperlipidemia LDL goal <130       Morbid obesity (H)      Non morbid obesity, unspecified obesity type 8/31/2017     Unspecified essential hypertension          Current Outpatient Medications   Medication Sig Dispense Refill     Multiple Vitamins-Iron (DAILY-CHARLES/IRON/BETA-CAROTENE) TABS TAKE 1 TABLET BY MOUTH DAILY. (Patient not taking: Reported on 10/19/2020) 30 tablet 7     Social History     Tobacco Use     Smoking  status: Never Smoker     Smokeless tobacco: Never Used   Substance Use Topics     Alcohol use: Not on file     Family History   Problem Relation Age of Onset     Diabetes Mother      Diabetes Father          ROS:    10 point ROS of systems including Constitutional, Eyes, Respiratory, Cardiovascular, Gastroenterology, Genitourinary, Integumentary, Muscularskeletal, Psychiatric ,neurological were all negative except for pertinent positives noted in my HPI       OBJECTIVE:  /89 (BP Location: Left arm, Patient Position: Sitting, Cuff Size: Adult Regular)   Pulse 68   Temp 99.7  F (37.6  C) (Tympanic)   Resp 16   Wt 81.6 kg (180 lb)   LMP 02/25/2004   SpO2 99%   BMI 32.66 kg/m    Physical Exam:  GENERAL APPEARANCE: healthy, alert and no distress  EYES: EOMI,  PERRL, conjunctiva clear  HENT: ear canals and TM's normal.  Nose and mouth without ulcers, erythema or lesions  HENT: nasal turbinates boggy with bluish hue and rhinorrhea yellow  NECK: supple, nontender, no lymphadenopathy  RESP: a few scattered rhonchi and wheezes  CV: regular rates and rhythm, normal S1 S2, no murmur noted  ABDOMEN:  soft, nontender, no HSM or masses and bowel sounds normal  NEURO: Normal strength and tone, sensory exam grossly normal,  normal speech and mentation  SKIN: no suspicious lesions or rashes

## 2022-12-24 ENCOUNTER — HEALTH MAINTENANCE LETTER (OUTPATIENT)
Age: 67
End: 2022-12-24

## 2023-04-15 ENCOUNTER — HEALTH MAINTENANCE LETTER (OUTPATIENT)
Age: 68
End: 2023-04-15

## 2023-10-20 NOTE — LETTER
[address] [facility]  [address] office: [phone] fax: [phone]      Physical Therapy: TREATMENT/PROGRESS NOTE   Patient: Sanam Wallace (51 y.o. female)   Treatment Date: 10/20/2023   :  2006 MRN: [id number]   Visit #: 2   Insurance Allowable Auth Needed   BMN - $[id number] []Yes    [x]No    Insurance: Payor: Lokesh Sessions / Plan: [employer]  / Product Type: *No Product type* /   Insurance ID: [id number] - (Commercial)  Secondary Insurance (if applicable):    Treatment Diagnosis:   Right leg pain M79.604; Difficulty walking R26.2   Medical Diagnosis:    Right leg pain [M79.604]  Achilles tendinitis, right leg [M76.61]  Strain of right gastrocnemius muscle, initial encounter [S86.111A]  Swelling of right lower extremity [M79.89]   Referring Physician: Ramiro Groves MD  PCP: [email or web address]  [facility] signed (Y/N):     Date of Patient follow up with Physician:      Progress Report/POC: EVAL today  POC update due: 2023 (OR 10 visits /OR [address], whichever is less)      Preferred Language for Healthcare:   [x]English       []other:    SUBJECTIVE EXAMINATION     Patient Report/Comments: Pain remains concentrated to the calf. Reports worse after initial PT session but then able to do HEP the following days without flare up. Does not feel change in symptoms. Remains in the boot. States performing HEP gently with stretches. Cramping feeling persists.        Test used Initial score  10/16/23 10/20/2023   Pain Summary VAS 3-8 5/10   Functional questionnaire LEFS 56%    Other:                OBJECTIVE EXAMINATION     Observation: Lacks push off in gait    Test measurements: see eval    Exercises/Interventions:     Therapeutic Ex (78443)  resistance Sets/time Reps Notes/Cues/Progressions   Incline stretch  20\"  4 x     Gs belt stretch   20\"  4 x  HEP   Seated ankle pumps   [address] [facility]  [address] [address]  [phone]      November 26, 2022    RE:  Nayeli Byrd                                                                                                                                                       [facility] [id number]            To whom it may concern:    Nayeli Byrd was seen in clinic today for a febrile illness.  She may return to work when she has been fever free for 24 hours.          Sincerely,        Zuleika Marrufo PA-C    [facility]-

## 2024-01-28 ENCOUNTER — HEALTH MAINTENANCE LETTER (OUTPATIENT)
Age: 69
End: 2024-01-28

## 2024-06-16 ENCOUNTER — HEALTH MAINTENANCE LETTER (OUTPATIENT)
Age: 69
End: 2024-06-16

## 2025-02-02 ENCOUNTER — HEALTH MAINTENANCE LETTER (OUTPATIENT)
Age: 70
End: 2025-02-02

## 2025-06-18 ENCOUNTER — OFFICE VISIT (OUTPATIENT)
Dept: INTERNAL MEDICINE | Facility: CLINIC | Age: 70
End: 2025-06-18
Payer: MEDICARE

## 2025-06-18 VITALS
OXYGEN SATURATION: 96 % | SYSTOLIC BLOOD PRESSURE: 124 MMHG | TEMPERATURE: 98.7 F | BODY MASS INDEX: 31.91 KG/M2 | HEIGHT: 62 IN | HEART RATE: 78 BPM | DIASTOLIC BLOOD PRESSURE: 82 MMHG | WEIGHT: 173.4 LBS | RESPIRATION RATE: 16 BRPM

## 2025-06-18 DIAGNOSIS — Z78.0 ASYMPTOMATIC POSTMENOPAUSAL STATUS: ICD-10-CM

## 2025-06-18 DIAGNOSIS — Z12.31 VISIT FOR SCREENING MAMMOGRAM: ICD-10-CM

## 2025-06-18 DIAGNOSIS — Z12.11 SCREEN FOR COLON CANCER: ICD-10-CM

## 2025-06-18 DIAGNOSIS — Z00.00 MEDICARE ANNUAL WELLNESS VISIT, INITIAL: ICD-10-CM

## 2025-06-18 DIAGNOSIS — Z23 NEED FOR PROPHYLACTIC VACCINATION AGAINST STREPTOCOCCUS PNEUMONIAE (PNEUMOCOCCUS): ICD-10-CM

## 2025-06-18 DIAGNOSIS — Z11.59 NEED FOR HEPATITIS C SCREENING TEST: ICD-10-CM

## 2025-06-18 PROCEDURE — 3079F DIAST BP 80-89 MM HG: CPT | Performed by: INTERNAL MEDICINE

## 2025-06-18 PROCEDURE — 3074F SYST BP LT 130 MM HG: CPT | Performed by: INTERNAL MEDICINE

## 2025-06-18 PROCEDURE — G0438 PPPS, INITIAL VISIT: HCPCS | Performed by: INTERNAL MEDICINE

## 2025-06-18 PROCEDURE — G0009 ADMIN PNEUMOCOCCAL VACCINE: HCPCS | Performed by: INTERNAL MEDICINE

## 2025-06-18 PROCEDURE — 90677 PCV20 VACCINE IM: CPT | Performed by: INTERNAL MEDICINE

## 2025-06-18 RX ORDER — POLYETHYLENE GLYCOL 3350 17 G/17G
1 POWDER, FOR SOLUTION ORAL PRN
COMMUNITY

## 2025-06-18 SDOH — HEALTH STABILITY: PHYSICAL HEALTH: ON AVERAGE, HOW MANY DAYS PER WEEK DO YOU ENGAGE IN MODERATE TO STRENUOUS EXERCISE (LIKE A BRISK WALK)?: 4 DAYS

## 2025-06-18 ASSESSMENT — PATIENT HEALTH QUESTIONNAIRE - PHQ9
SUM OF ALL RESPONSES TO PHQ QUESTIONS 1-9: 8
10. IF YOU CHECKED OFF ANY PROBLEMS, HOW DIFFICULT HAVE THESE PROBLEMS MADE IT FOR YOU TO DO YOUR WORK, TAKE CARE OF THINGS AT HOME, OR GET ALONG WITH OTHER PEOPLE: SOMEWHAT DIFFICULT
SUM OF ALL RESPONSES TO PHQ QUESTIONS 1-9: 8

## 2025-06-18 ASSESSMENT — SOCIAL DETERMINANTS OF HEALTH (SDOH): HOW OFTEN DO YOU GET TOGETHER WITH FRIENDS OR RELATIVES?: ONCE A WEEK

## 2025-06-18 NOTE — PROGRESS NOTES
Preventive Care Visit  [facility]  Wing Olivia MD, Internal Medicine  Jun 18, 2025       ASSESSMENT:    1. Medicare annual wellness visit, initial  Screening labs as ordered.  Patient will consider COVID and influenza vaccinations in October.  Declines COVID booster today.  Due for tetanus booster through pharmacy.  Discussed Shingrix option.  Pneumococcal vaccination today as below.  Due for breast cancer screening.  Colon cancer screening as below.  Due for eye exam.  Counseled regarding diet and exercise for weight loss  - Comprehensive metabolic panel; Future  - Lipid panel reflex to direct LDL Fasting; Future  - CBC with platelets; Future    2. Visit for screening mammogram  Asymptomatic.  Due for screening  - MA Screening Bilateral w/ Damien; Future    3. Screen for colon cancer  Based on the last colonoscopy findings and most recent updated guidelines regarding management of colon polyp hx, patient will be due for repeat colonoscopy in 2028    4. Asymptomatic postmenopausal status  Menopausal.  Needs bone density screening  - DEXA HIP/PELVIS/SPINE - Future; Future    5. Need for hepatitis C screening test  Candidate for screening based on age, low risk  - Hepatitis C Screen Reflex to HCV RNA Quant and Genotype; Future    6. Need for prophylactic vaccination against Streptococcus pneumoniae (pneumococcus)  Candidate for vaccination  - Pneumococcal 20 Valent Conjugate (PCV20)      PLAN:  Call  [phone] or use [employer] to schedule a future lab appointment  fasting in 1-2 weeks.   For fasting labs, please refrain from eating for 8 hours or more.   Drink 2 glasses of water before your lab appointment. It is fine to take your  oral medications on the morning of the lab test as usual  Reduce calorie/carbohydrate (sugar, bread, potato, pasta, rice, alcohol etc)  intake in diet.  Increase color on your plate with vegetables. Increase  frequency of walking or other aerobic exercise as able (goal  is daily)  Bone density (DEXA)   and Mammogram     This will be done at the [facility]. Call [phone] or use [employer] to schedule.   Schedule an eye exam appointment. Please ask the eye clinic to fax us a report of your eye exam to [phone], attention Dr Olivia  Vaccination given today: Prevnar 20 vaccine (pneumonia risk reduction)  I would recommend an updated covid vaccination and an influenza/flu vaccination in the Fall (mid/late October)  at the clinic or any pharmacy  I would recommend  a  Td vaccine  (tetanus risk reduction). Get at a pharmacy  Get Shingrix vaccinations at your pharmacy (now Medicare covered). This is a 2 shot series done 2-6 months apart  Colonoscopy in 2028 based on new guidelines  Healthcare  Directive discussed and given a copy.    Complete the directive and then bring it back to the [facility] Internal Medicine dept  to be scanned into your chart or download a copy into [employer]  and send to us in a [employer] message           Araseli Casarez is a 69 year old, presenting for the following:  Wellness Visit           HPI     Advance Care Planning  Patient does not have a healthcare directive.  Discussed form with patient and given copy to complete and return to clinic          6/18/2025   General Health   How would you rate your overall physical health? Good   Feel stress (tense, anxious, or unable to sleep) Very much   (!) STRESS CONCERN      6/18/2025   Nutrition   Diet: Regular (no restrictions)         6/18/2025   Exercise   Days per week of moderate/strenous exercise 4 days         6/18/2025   Social Factors   Frequency of gathering with friends or relatives Once a week   Worry food won't last until get money to buy more No   Food not last or not have enough money for food? No   Do you have housing? (Housing is defined as stable permanent housing and does not include staying outside in a car, in a tent, in an abandoned building, in an overnight shelter, or  couch-surfing.) Yes   Are you worried about losing your housing? No   Lack of transportation? No   Unable to get utilities (heat,electricity)? No         6/18/2025   Fall Risk   Fallen 2 or more times in the past year? No   Trouble with walking or balance? No          6/18/2025   Activities of Daily Living- Home Safety   Needs help with the following daily activites None of the above   Safety concerns in the home None of the above         6/18/2025   Dental   Dentist two times every year? Yes         6/18/2025   Hearing Screening   Hearing concerns? (!) I FEEL THAT PEOPLE ARE MUMBLING OR NOT SPEAKING CLEARLY.    (!) I NEED TO ASK PEOPLE TO SPEAK UP OR REPEAT THEMSELVES.    (!) IT'S HARD TO FOLLOW A CONVERSATION IN A NOISY RESTAURANT OR CROWDED ROOM.    (!) TROUBLE FOLLOWING DIALOGUE IN THE THEATHER.    (!) TROUBLE UNDERSTANDING SOFT OR WHISPERED SPEECH.    (!) TROUBLE UNDERSTANDING SPEECH ON THE TELEPHONE   Would you like a referral for hearing testing? (!) YES       Multiple values from one day are sorted in reverse-chronological order         6/18/2025   Driving Risk Screening   Patient/family members have concerns about driving No         6/18/2025   General Alertness/Fatigue Screening   Have you been more tired than usual lately? (!) YES         6/18/2025   Urinary Incontinence Screening   Bothered by leaking urine in past 6 months No       Today's PHQ-9 Score:       6/18/2025     9:05 AM   PHQ-9 SCORE   PHQ-9 Total Score MyChart 8 (Mild depression)   PHQ-9 Total Score 8        Patient-reported         6/18/2025   Substance Use   Alcohol more than 3/day or more than 7/wk No   Do you have a current opioid prescription? No   How severe/bad is pain from 1 to 10? 1/10   Do you use any other substances recreationally? No     Social History     Tobacco Use    Smoking status: Never    Smokeless tobacco: Former   Substance Use Topics    Alcohol use: Yes     Comment: 2 per month    Drug use: No            9/30/2021    LAST FHS-7 RESULTS   1st degree relative breast or ovarian cancer No   Any relative bilateral breast cancer No   Any male have breast cancer No   Any ONE woman have BOTH breast AND ovarian cancer Yes   Any woman with breast cancer before 50yrs Yes   2 or more relatives with breast AND/OR ovarian cancer No   2 or more relatives with breast AND/OR bowel cancer No        Mammogram Screening - Mammogram every 1-2 years updated in Health Maintenance based on mutual decision making      History of abnormal Pap smear: No - age 65 or older with pap indicated due to inadequate prior screening (3 consecutive negative cytology results, 2 consecutive negative cotesting results, or 2 consecutive negative HrHPV test results within 10 years, with the most recent test occurring within the recommended screening interval for the test used)        Latest Ref Rng & Units 3/15/2018     2:02 PM 3/15/2018     1:37 PM 5/20/2009    12:00 AM   PAP / HPV   PAP (Historical)   OTHER-NIL, See Result  NIL    HPV 16 DNA NEG^Negative Negative      HPV 18 DNA NEG^Negative Negative      Other HR HPV NEG^Negative Negative        ASCVD Risk   The ASCVD Risk score (Zander MULLEN, et al., 2019) failed to calculate for the following reasons:    Cannot find a previous HDL lab    Cannot find a previous total cholesterol lab     Pt's past medical history, family history, habits, medications and allergies were reviewed with the patient today.   Most recent lab results reviewed with pt. Problem list and histories reviewed & adjusted, as indicated.    Screening questionnaire responses above  regarding general health status, nutrition, exercise, social engagement, fall risk, ADL home safety, dental concerns, hearing concerns, driving concerns, alertness, incontinence concerns, mental health, substance concerns were reviewed with the patient today. Cognitive status  was also reviewed as below. Weight/BMI status reviewed.  Preventative Healthcare counseling  provided to pt as applicable based on positive pt responses/concerns/results of above screening questionnaire.  See pt instructions for additional recommendations.  Otherwise reinforced continuation of good health care maintenance as above.  Also reviewed immunization guidelines,   eye screening for vision/glaucoma at last every other year, routine cancer screenings including annual skin cancer check with myself or dermatology, colon cancer screening for everyone until age 75 and then individualized to age 80, , mammogram screening in women to age 75 and then individualized to age 80, PAP/pelvic for women until age 65.  DEXA/bone density status: Due for DEXA  See plan discussion above re: healthcare maintenance recommendations       Current providers sharing in care for this patient include:  Patient Care Team:  Wing Olivia MD as PCP - General    The following health maintenance items are reviewed in Epic and correct as of today:  Health Maintenance   Topic Date Due    ANNUAL REVIEW OF  ORDERS  Never done    HEPATITIS C SCREENING  Never done    PNEUMOCOCCAL VACCINE 50+ YEARS (1 of 1 - PCV) Never done    ZOSTER VACCINE (1 of 2) Never done    ADVANCE CARE PLANNING  10/30/2017    LIPID  11/06/2019    MEDICARE ANNUAL WELLNESS VISIT  10/14/2020    DIABETES SCREENING  11/06/2021    MAMMO SCREENING  09/30/2022    DTAP/TDAP/TD VACCINE (2 - Td or Tdap) 10/30/2022    COLORECTAL CANCER SCREENING  05/04/2023    COVID-19 VACCINE (5 - 2024-25 season) 09/01/2024    DEXA  02/18/2025    INFLUENZA VACCINE (Season Ended) 09/01/2025    FALL RISK ASSESSMENT  06/18/2026    RSV VACCINE (1 - 1-dose 75+ series) 10/14/2030    PHQ-2 (once per calendar year)  Completed    HPV VACCINE  Aged Out    MENINGITIS VACCINE  Aged Out    PAP  Discontinued         Review of Systems  CONSTITUTIONAL: NEGATIVE for fever, chills,. Weight down 7 pounds since last visit based on Baltic scale but pt states down 20 pounds overall in 4  "months  INTEGUMENTARY/SKIN: NEGATIVE for worrisome rashes, moles or lesions  EYES: NEGATIVE for vision changes or irritation. Due for eye exam   ENT/MOUTH: NEGATIVE for ear, mouth and throat problems  RESP: NEGATIVE for significant cough or SOB  BREAST: NEGATIVE for masses, tenderness or discharge  CV: NEGATIVE for chest pain, palpitations or peripheral edema  GI: NEGATIVE for nausea, abdominal pain, heartburn, or change in bowel habits  : NEGATIVE for  dysuria, or hematuria.   Has urine frequency. Urinates every 3-4 hours. 2 coffee/day and drinks pitcher of water/day for enjoyment.  Symptoms not bothersome enough the patient wishes to consider medication therapy or significantly reduce caffeine/water intake at this time  MUSCULOSKELETAL: NEGATIVE for significant arthralgias or myalgia that limits activity  NEURO: NEGATIVE for weakness, dizziness or paresthesias  ENDOCRINE: NEGATIVE for temperature intolerance  HEME: NEGATIVE for bleeding problems  PSYCHIATRIC: NEGATIVE for changes in mood or affect overall. Some stress with children and grandchild  living with  pt and .  Declines need for treatment     Objective    Exam  /82   Pulse 78   Temp 98.7  F (37.1  C) (Temporal)   Resp 16   Ht 1.575 m (5' 2\")   Wt 78.7 kg (173 lb 6.4 oz)   LMP 02/25/2004   SpO2 96%   BMI 31.72 kg/m     Estimated body mass index is 31.72 kg/m  as calculated from the following:    Height as of this encounter: 1.575 m (5' 2\").    Weight as of this encounter: 78.7 kg (173 lb 6.4 oz).    Physical Exam  General appearance -  alert, no distress  Skin - No rashes or lesions.  Head - normocephalic, atraumatic  Eyes - REYNOLD, EOMI, fundi exam with nondilated pupils negative.  Ears - External ears normal. Canals clear. TM's normal.  Nose/Sinuses - Nares normal. Septum midline. Mucosa normal. No drainage or sinus tenderness.  Oropharynx - No erythema, no adenopathy, no exudates.  Neck - Supple without adenopathy or thyromegaly. " No bruits.  Lungs - Clear to auscultation without wheezes/rhonchi.  Heart - Regular rate and rhythm without murmurs, clicks, or gallops.  Nodes - No supraclavicular, axillary, or inguinal adenopathy palpable.  Breasts - deferred  Abdomen - Abdomen mildly obese, soft, non-tender. BS normal. No masses or hepatosplenomegaly palpable. No bruits.  Extremities -No cyanosis, clubbing or edema.    Musculoskeletal - Spine ROM normal. Muscular strength intact.   Peripheral pulses - radial=4/4, femoral=4/4, posterior tibial=4/4, dorsalis pedis=4/4,  Neuro - Gait normal. Reflexes normal and symmetric. Sensation grossly WNL.  Genital/Rectal - deferred          6/18/2025   Mini Cog   Clock Draw Score 2 Normal   3 Item Recall 3 objects recalled   Mini Cog Total Score 5          Signed Electronically by: Wing Olivia MD

## 2025-06-18 NOTE — PATIENT INSTRUCTIONS
Call  [phone] or use [employer] to schedule a future lab appointment  fasting in 1-2 weeks.   For fasting labs, please refrain from eating for 8 hours or more.   Drink 2 glasses of water before your lab appointment. It is fine to take your  oral medications on the morning of the lab test as usual  Reduce calorie/carbohydrate (sugar, bread, potato, pasta, rice, alcohol etc)  intake in diet.  Increase color on your plate with vegetables. Increase  frequency of walking or other aerobic exercise as able (goal is daily)  Bone density (DEXA)   and Mammogram     This will be done at the [facility]. Call [phone] or use [employer] to schedule.   Schedule an eye exam appointment. Please ask the eye clinic to fax us a report of your eye exam to [phone], attention Dr Olivia  Vaccination given today: Prevnar 20 vaccine (pneumonia risk reduction)  I would recommend an updated covid vaccination and an influenza/flu vaccination in the Fall (mid/late October)  at the clinic or any pharmacy  I would recommend  a  Td vaccine  (tetanus risk reduction). Get at a pharmacy  Get Shingrix vaccinations at your pharmacy (now Medicare covered). This is a 2 shot series done 2-6 months apart  Colonoscopy in 2028 based on new guidelines  Healthcare  Directive discussed and given a copy.    Complete the directive and then bring it back to the [facility] Internal Medicine dept  to be scanned into your chart or download a copy into [employer]  and send to us in a [employer] message

## 2025-06-19 ENCOUNTER — PATIENT OUTREACH (OUTPATIENT)
Dept: CARE COORDINATION | Facility: CLINIC | Age: 70
End: 2025-06-19
Payer: MEDICARE

## 2025-06-30 ENCOUNTER — TELEPHONE (OUTPATIENT)
Dept: INTERNAL MEDICINE | Facility: CLINIC | Age: 70
End: 2025-06-30
Payer: MEDICARE

## 2025-06-30 NOTE — TELEPHONE ENCOUNTER
Pt got lab appt 7/21. Please advise if pt is able to keep that appointment or if pt should try to be seen sooner for labs. [facility] lab early AM booked out a few weeks. Some availability around 8/9 AM in 1-2 weeks   Lu REYNOSO

## 2025-06-30 NOTE — TELEPHONE ENCOUNTER
General Call      Reason for Call:  QUESTION ABT LAB SCHEDULED APPT     What are your questions or concerns:  PT SAID DR. LAZAR WAS WANTING PT TO GET LAB DRAW BY THE END OF JUNE BUT SHE WAS ONLY ABLE TO FIND AN EARLY MORNING TOWARDS THE END OF JULY DUE TO HER FAMILY SITUATION AND BABYSITTING.     Date of last appointment with provider: 06/18/2025    Could we send this information to you in [employer] or would you prefer to receive a phone call?:   Patient would prefer a phone call   Okay to leave a detailed message?: Yes at Cell number on file:    Telephone Information:   Mobile [phone]

## 2025-07-09 ENCOUNTER — TELEPHONE (OUTPATIENT)
Dept: INTERNAL MEDICINE | Facility: CLINIC | Age: 70
End: 2025-07-09
Payer: MEDICARE

## 2025-07-09 NOTE — TELEPHONE ENCOUNTER
Patient called to find out if the fasting lab orders would be covered under the annual visit. Labs were ordered and discussed on day of visit. Informed that these should be covered.

## 2025-07-09 NOTE — TELEPHONE ENCOUNTER
Left VM asking pt to call clinic back. On call back, please inform pt what lab orders were placed and advise her to check with her insurance on coverage. As noted below, labs were placed during annual wellness visit.

## 2025-07-14 NOTE — TELEPHONE ENCOUNTER
Sent patient MyChart message with pertinent portions of Dr. Olivia's message.   Estrella Cole, CMA

## 2025-07-14 NOTE — TELEPHONE ENCOUNTER
Call patient.  Has lab appointment scheduled for 7/21/2025.  Fine to wait until that appointment time for lab work.  I saw patient previously in June after not having seen her for 8 years prior.  All the labs I have ordered has been placed under the diagnosis of her preventative screening examination so I assume they will be covered by her insurance but cannot 100% guarantee.  When patient had an appointment in June, I suggested to have labs done in the next 1 to 2 weeks simply imply letting her know that it was nothing that required an acute laboratory evaluation since they were screening labs to assess patient's status after having last seen her 8 years prior.  Would have drawn labs on the day I saw her but labs need to be done fasting for cholesterol and blood sugar evaluations and patient was not fasting at the time of her appointment in June

## 2025-07-18 ENCOUNTER — ANCILLARY PROCEDURE (OUTPATIENT)
Dept: BONE DENSITY | Facility: CLINIC | Age: 70
End: 2025-07-18
Attending: INTERNAL MEDICINE
Payer: MEDICARE

## 2025-07-18 ENCOUNTER — ANCILLARY PROCEDURE (OUTPATIENT)
Dept: MAMMOGRAPHY | Facility: CLINIC | Age: 70
End: 2025-07-18
Attending: INTERNAL MEDICINE
Payer: MEDICARE

## 2025-07-18 DIAGNOSIS — Z12.31 VISIT FOR SCREENING MAMMOGRAM: ICD-10-CM

## 2025-07-18 DIAGNOSIS — Z78.0 ASYMPTOMATIC POSTMENOPAUSAL STATUS: ICD-10-CM

## 2025-07-18 PROCEDURE — 77080 DXA BONE DENSITY AXIAL: CPT | Mod: TC | Performed by: NURSE PRACTITIONER

## 2025-07-18 PROCEDURE — 77063 BREAST TOMOSYNTHESIS BI: CPT | Mod: TC | Performed by: RADIOLOGY

## 2025-07-18 PROCEDURE — 77067 SCR MAMMO BI INCL CAD: CPT | Mod: TC | Performed by: RADIOLOGY

## 2025-07-21 ENCOUNTER — LAB (OUTPATIENT)
Dept: LAB | Facility: CLINIC | Age: 70
End: 2025-07-21
Payer: MEDICARE

## 2025-07-21 DIAGNOSIS — Z00.00 MEDICARE ANNUAL WELLNESS VISIT, INITIAL: ICD-10-CM

## 2025-07-21 DIAGNOSIS — Z11.59 NEED FOR HEPATITIS C SCREENING TEST: ICD-10-CM

## 2025-07-21 LAB
ALBUMIN SERPL BCG-MCNC: 4.3 G/DL (ref 3.5–5.2)
ALP SERPL-CCNC: 108 U/L (ref 40–150)
ALT SERPL W P-5'-P-CCNC: 16 U/L (ref 0–50)
ANION GAP SERPL CALCULATED.3IONS-SCNC: 15 MMOL/L (ref 7–15)
AST SERPL W P-5'-P-CCNC: 25 U/L (ref 0–45)
BILIRUB SERPL-MCNC: 0.7 MG/DL
BUN SERPL-MCNC: 7.9 MG/DL (ref 8–23)
CALCIUM SERPL-MCNC: 9.4 MG/DL (ref 8.8–10.4)
CHLORIDE SERPL-SCNC: 104 MMOL/L (ref 98–107)
CHOLEST SERPL-MCNC: 219 MG/DL
CREAT SERPL-MCNC: 0.63 MG/DL (ref 0.51–0.95)
EGFRCR SERPLBLD CKD-EPI 2021: >90 ML/MIN/1.73M2
ERYTHROCYTE [DISTWIDTH] IN BLOOD BY AUTOMATED COUNT: 13.5 % (ref 10–15)
FASTING STATUS PATIENT QL REPORTED: YES
FASTING STATUS PATIENT QL REPORTED: YES
GLUCOSE SERPL-MCNC: 102 MG/DL (ref 70–99)
HCO3 SERPL-SCNC: 23 MMOL/L (ref 22–29)
HCT VFR BLD AUTO: 40.4 % (ref 35–47)
HCV AB SERPL QL IA: NONREACTIVE
HDLC SERPL-MCNC: 46 MG/DL
HGB BLD-MCNC: 13.8 G/DL (ref 11.7–15.7)
LDLC SERPL CALC-MCNC: 152 MG/DL
MCH RBC QN AUTO: 31.2 PG (ref 26.5–33)
MCHC RBC AUTO-ENTMCNC: 34.2 G/DL (ref 31.5–36.5)
MCV RBC AUTO: 91 FL (ref 78–100)
NONHDLC SERPL-MCNC: 173 MG/DL
PLATELET # BLD AUTO: 227 10E3/UL (ref 150–450)
POTASSIUM SERPL-SCNC: 3.4 MMOL/L (ref 3.4–5.3)
PROT SERPL-MCNC: 7.7 G/DL (ref 6.4–8.3)
RBC # BLD AUTO: 4.42 10E6/UL (ref 3.8–5.2)
SODIUM SERPL-SCNC: 142 MMOL/L (ref 135–145)
TRIGL SERPL-MCNC: 103 MG/DL
WBC # BLD AUTO: 5.9 10E3/UL (ref 4–11)

## 2025-07-21 PROCEDURE — 36415 COLL VENOUS BLD VENIPUNCTURE: CPT

## 2025-07-21 PROCEDURE — 86803 HEPATITIS C AB TEST: CPT

## 2025-07-21 PROCEDURE — 80061 LIPID PANEL: CPT | Mod: GZ

## 2025-07-21 PROCEDURE — 85027 COMPLETE CBC AUTOMATED: CPT | Mod: GZ

## 2025-07-21 PROCEDURE — 80053 COMPREHEN METABOLIC PANEL: CPT

## (undated) DEVICE — EYE TIP IRRIGATION & ASPIRATION POLYMER 35D BENT 8065751511

## (undated) DEVICE — PACK CATARACT CUSTOM SO DALE SEY32CTFCX

## (undated) DEVICE — GLOVE PROTEXIS MICRO 7.0  2D73PM70

## (undated) DEVICE — EYE PACK CUSTOM ANTERIOR 30DEG TIP CENTURION PPK6682-04

## (undated) DEVICE — EYE SOL BSS 500ML

## (undated) DEVICE — EYE PACK BVI READYPAK KIT #1

## (undated) DEVICE — LINEN TOWEL PACK X5 5464

## (undated) DEVICE — EYE KNIFE SLIT XSTAR VISITEC 2.4MM 45DEG BEVEL UP 373724

## (undated) DEVICE — SOL WATER IRRIG 1000ML BOTTLE 2F7114

## (undated) DEVICE — GLOVE PROTEXIS MICRO 8.0  2D73PM80

## (undated) DEVICE — EYE SHIELD PLASTIC

## (undated) RX ORDER — FENTANYL CITRATE 50 UG/ML
INJECTION, SOLUTION INTRAMUSCULAR; INTRAVENOUS
Status: DISPENSED
Start: 2017-09-07

## (undated) RX ORDER — FENTANYL CITRATE 50 UG/ML
INJECTION, SOLUTION INTRAMUSCULAR; INTRAVENOUS
Status: DISPENSED
Start: 2018-05-04

## (undated) RX ORDER — ONDANSETRON 2 MG/ML
INJECTION INTRAMUSCULAR; INTRAVENOUS
Status: DISPENSED
Start: 2017-09-07

## (undated) RX ORDER — LIDOCAINE HYDROCHLORIDE 10 MG/ML
INJECTION, SOLUTION EPIDURAL; INFILTRATION; INTRACAUDAL; PERINEURAL
Status: DISPENSED
Start: 2017-09-21

## (undated) RX ORDER — TETRACAINE HYDROCHLORIDE 5 MG/ML
SOLUTION OPHTHALMIC
Status: DISPENSED
Start: 2017-09-21

## (undated) RX ORDER — FENTANYL CITRATE 50 UG/ML
INJECTION, SOLUTION INTRAMUSCULAR; INTRAVENOUS
Status: DISPENSED
Start: 2017-09-21

## (undated) RX ORDER — LIDOCAINE HYDROCHLORIDE 10 MG/ML
INJECTION, SOLUTION EPIDURAL; INFILTRATION; INTRACAUDAL; PERINEURAL
Status: DISPENSED
Start: 2017-09-07